# Patient Record
Sex: MALE | Race: WHITE | Employment: FULL TIME | URBAN - METROPOLITAN AREA
[De-identification: names, ages, dates, MRNs, and addresses within clinical notes are randomized per-mention and may not be internally consistent; named-entity substitution may affect disease eponyms.]

---

## 2019-07-03 ENCOUNTER — OFFICE VISIT (OUTPATIENT)
Dept: ORTHOPEDIC SURGERY | Age: 54
End: 2019-07-03

## 2019-07-03 VITALS
HEIGHT: 67 IN | SYSTOLIC BLOOD PRESSURE: 105 MMHG | DIASTOLIC BLOOD PRESSURE: 69 MMHG | HEART RATE: 121 BPM | OXYGEN SATURATION: 93 % | RESPIRATION RATE: 16 BRPM | TEMPERATURE: 97.4 F | BODY MASS INDEX: 38.77 KG/M2 | WEIGHT: 247 LBS

## 2019-07-03 DIAGNOSIS — E66.01 SEVERE OBESITY (HCC): ICD-10-CM

## 2019-07-03 DIAGNOSIS — M50.20 HERNIATED CERVICAL DISC: Primary | ICD-10-CM

## 2019-07-03 RX ORDER — DICLOFENAC SODIUM 75 MG/1
TABLET, DELAYED RELEASE ORAL
COMMUNITY

## 2019-07-03 RX ORDER — TRAVOPROST OPHTHALMIC SOLUTION 0.04 MG/ML
1 SOLUTION OPHTHALMIC EVERY EVENING
COMMUNITY

## 2019-07-03 RX ORDER — METFORMIN HYDROCHLORIDE 1000 MG/1
1000 TABLET ORAL 2 TIMES DAILY WITH MEALS
COMMUNITY

## 2019-07-03 RX ORDER — ATORVASTATIN CALCIUM 20 MG/1
TABLET, FILM COATED ORAL DAILY
COMMUNITY

## 2019-07-03 RX ORDER — CYCLOBENZAPRINE HCL 10 MG
TABLET ORAL
COMMUNITY

## 2019-07-03 RX ORDER — HYDROCODONE BITARTRATE AND ACETAMINOPHEN 7.5; 325 MG/1; MG/1
1 TABLET ORAL
Qty: 28 TAB | Refills: 0 | Status: SHIPPED | OUTPATIENT
Start: 2019-07-03 | End: 2019-07-10

## 2019-07-03 RX ORDER — INSULIN GLARGINE 100 [IU]/ML
INJECTION, SOLUTION SUBCUTANEOUS
COMMUNITY

## 2019-07-03 RX ORDER — CITALOPRAM 10 MG/1
TABLET ORAL DAILY
COMMUNITY

## 2019-07-03 RX ORDER — OMEPRAZOLE 20 MG/1
20 CAPSULE, DELAYED RELEASE ORAL DAILY
COMMUNITY

## 2019-07-03 RX ORDER — LISINOPRIL AND HYDROCHLOROTHIAZIDE 12.5; 2 MG/1; MG/1
TABLET ORAL DAILY
COMMUNITY

## 2019-07-03 RX ORDER — METHYLPREDNISOLONE 4 MG/1
TABLET ORAL
Qty: 1 DOSE PACK | Refills: 0 | Status: SHIPPED | OUTPATIENT
Start: 2019-07-03

## 2019-07-03 NOTE — PROGRESS NOTES
Antionette Herrera  1965   Chief Complaint   Patient presents with    Shoulder Pain     right shoulder pain        HISTORY OF PRESENT ILLNESS  Antionette Herrera is a 47 y.o. male who presents today for evaluation of acute neck and right shoulder pain. Pain started 2 weeks ago. No injury. Acute onset. Pain radiates from neck down to fingers. Feels better when arm is raised over head. Has decreased sensation in 1-3 digits on RUE. Patient describes the pain as burning, numbing, sharp and stabbing that is Constant in nature. Symptoms are worse with any motion of head or arm and is better with  Raising arm over head. Associated symptoms include numbness, tingling, weakness. Since problem started, it: has worsened. Pain does wake patient up at night. Has taken diclofenac, flexeril and home exercise plan of exercises for the problem. Pain is a 6/10. Has tried following treatments: Injections:NO; Brace:NO; Therapy:YES (home exercise plan); Cane/Crutch:NO        . Not on File     No past medical history on file.    Social History     Socioeconomic History    Marital status: UNKNOWN     Spouse name: Not on file    Number of children: Not on file    Years of education: Not on file    Highest education level: Not on file   Occupational History    Not on file   Social Needs    Financial resource strain: Not on file    Food insecurity:     Worry: Not on file     Inability: Not on file    Transportation needs:     Medical: Not on file     Non-medical: Not on file   Tobacco Use    Smoking status: Former Smoker     Last attempt to quit: 7/3/1999     Years since quittin.0    Smokeless tobacco: Never Used   Substance and Sexual Activity    Alcohol use: Not on file    Drug use: Not on file    Sexual activity: Not on file   Lifestyle    Physical activity:     Days per week: Not on file     Minutes per session: Not on file    Stress: Not on file   Relationships    Social connections:     Talks on phone: Not on file     Gets together: Not on file     Attends Taoism service: Not on file     Active member of club or organization: Not on file     Attends meetings of clubs or organizations: Not on file     Relationship status: Not on file    Intimate partner violence:     Fear of current or ex partner: Not on file     Emotionally abused: Not on file     Physically abused: Not on file     Forced sexual activity: Not on file   Other Topics Concern    Not on file   Social History Narrative    Not on file      No past surgical history on file. No family history on file. Current Outpatient Medications   Medication Sig    diclofenac EC (VOLTAREN) 75 mg EC tablet Take  by mouth.  cyclobenzaprine (FLEXERIL) 10 mg tablet Take  by mouth three (3) times daily as needed for Muscle Spasm(s).  citalopram (CELEXA) 10 mg tablet Take  by mouth daily.  atorvastatin (LIPITOR) 20 mg tablet Take  by mouth daily.  lisinopril-hydroCHLOROthiazide (PRINZIDE, ZESTORETIC) 20-12.5 mg per tablet Take  by mouth daily.  omeprazole (PRILOSEC) 20 mg capsule Take 20 mg by mouth daily.  metFORMIN (GLUCOPHAGE) 1,000 mg tablet Take 1,000 mg by mouth two (2) times daily (with meals).  travoprost (TRAVATAN Z) 0.004 % ophthalmic solution Administer 1 Drop to both eyes every evening.  insulin glargine (LANTUS,BASAGLAR) 100 unit/mL (3 mL) inpn by SubCUTAneous route.  semaglutide (OZEMPIC SC) by SubCUTAneous route.  methylPREDNISolone (MEDROL DOSEPACK) 4 mg tablet Per dose pack instructions    HYDROcodone-acetaminophen (NORCO) 7.5-325 mg per tablet Take 1 Tab by mouth every six (6) hours as needed for Pain for up to 7 days. Max Daily Amount: 4 Tabs. No current facility-administered medications for this visit. REVIEW OF SYSTEM   Patient denies: Weight loss, Fever/Chills, HA, Visual changes, Fatigue, Chest pain, SOB, Abdominal pain, N/V/D/C, Blood in stool or urine, Edema.    Pertinent positive as above in HPI. All others were negative    PHYSICAL EXAM:   Visit Vitals  /69 (BP 1 Location: Left arm, BP Patient Position: Sitting)   Pulse (!) 121   Temp 97.4 °F (36.3 °C) (Oral)   Resp 16   Ht 5' 7\" (1.702 m)   Wt 247 lb (112 kg)   SpO2 93%   BMI 38.69 kg/m²     The patient is a well-developed, well-nourished male   in no acute distress. The patient is alert and oriented times three. The patient is alert and oriented times three. Mood and affect are normal.  LYMPHATIC: lymph nodes are not enlarged and are within normal limits  SKIN: normal in color and non tender to palpation. There are no bruises or abrasions noted. NEUROLOGICAL: Motor sensory exam is within normal limits. Reflexes are equal bilaterally. There is normal sensation to pinprick and light touch  MUSCULOSKELETAL:  Examination Neck   Skin Intact   Tenderness, Paracervical +   Paracervical spasms  +   Flexion Decreased 25%   Extension Decreased 25%   Lateral bend left Normal   Lateral bend right Normal   Masses -   Spurling sign -   Biceps reflex Normal   Triceps reflex Normal   Brachioradialis reflex Normal   Sensation Decreased 1-4 digits     Decreased  strength RUE     Examination Right shoulder   Skin Intact   AC joint tenderness -   Biceps tenderness -   Forward flexion/Elevation    Active abduction    Glenohumeral abduction 90   External rotation ROM 30   Internal rotation ROM 30   Apprehension -   Kinjals Relocation -   Jerk -   Load and Shift -   Obriens -   Speeds -   Impingement sign -   Supraspinatus/Empty Can +   External Rotation Strength -, 5/5   Lift Off/Belly Press -, 5/5   Neurovascular Intact           IMAGIN views of cspine read and reviewed by myself reveal loss of cervical lordosis. degnerative spurring noted c4-c7    2view xray right shoulder read and reviewed by myself reveal no acute abnormalities  IMPRESSION:      ICD-10-CM ICD-9-CM    1.  Herniated cervical disc M50.20 722.0 HYDROcodone-acetaminophen (NORCO) 7.5-325 mg per tablet   2. Severe obesity ([address] E66.01 278.01         PLAN:   1. Patient with symptoms indicative of herniated disc. Medrol dose pack and norco given today. STAT MRI cspine and will have follow up with SPINE center ASAP  Risk factors include: BMI>35  2. No cortisone injection indicated today   3. No Physical/Occupational Therapy indicated today  4. Yes diagnostic test indicated today: STAT MRI cspine  5. No durable medical equipment indicated today  6. Yes referral indicated today spine center  7. Yes medications indicated today: Medrol dose pack and norco 7.5  8. Yes Narcotic indicated today for short term acute pain secondary to acute pain. Patient given pain medication for short term acute pain relief. Goal is to treat patient according to above plan and to ultimately have patient off all pain medications once appropriate. If chronic pain management is required beyond what is expected for current orthopedic problem, will refer patient to pain management.   was reviewed and will be reviewed with every medication refill request.         RTC PRN       KILEY [facility]

## 2019-07-03 NOTE — PATIENT INSTRUCTIONS
Stop taking diclofenac now. Start taking medrol dose pack instead. Resume diclofenac after medrol dose pack is completed.

## 2019-07-03 NOTE — PROGRESS NOTES
1. Have you been to the ER, urgent care clinic since your last visit? Hospitalized since your last visit? NO    2. Have you seen or consulted any other health care providers outside of the [address] [place] [address] since your last visit? Include any pap smears or colon screening.  Yes, Dr. Deanna Salcedo

## 2019-07-05 ENCOUNTER — TELEPHONE (OUTPATIENT)
Dept: ORTHOPEDIC SURGERY | Age: 54
End: 2019-07-05

## 2019-07-05 DIAGNOSIS — M50.20 HERNIATED CERVICAL DISC: Primary | ICD-10-CM

## 2019-07-05 NOTE — TELEPHONE ENCOUNTER
Called and spoke to patient about his insurance not covering the MRI and also informed him the Kailyn Alert will be in this afternoon and can advise him of next step.

## 2019-07-05 NOTE — TELEPHONE ENCOUNTER
Received a phone call from Jacque Cutler with [employer]. Jacque Cutler states they did not approve the STAT MRI of Cervical Spine per following information:   Unable to show proof of no improved over 6 weeks, no notations of medication treatments or other therapies such as home, physical or manual therapy. No notation of upcoming surgery or procedures. This urgent case has been closed as of 07/05/19 3:13pm. You can contact to discuss further details with their physician at [phone].

## 2019-07-05 NOTE — TELEPHONE ENCOUNTER
I have obtained the approval [place] pre-authorization [id number] effective 07/05/19-08/03/19) for the MRI of the Cervical Spine, however, I cannot get the patient scheduled until I can speak with him. I have left a generic message on a generic voice mail requesting him to return my call. Thank you.

## 2019-07-05 NOTE — TELEPHONE ENCOUNTER
I have spoken to Mr. Jhon Araujo. He will attend  on Sunday, 07/07/19, at 8:30AM for his MRI. He is scheduled with Dr. Judah Garcia for his MRI review on Friday, 07/12/19. He is aware. Thank you.

## 2019-07-05 NOTE — TELEPHONE ENCOUNTER
All of this is documented in my note. Insurance needs to be called.  Get a peer to peer or resubmit order today STAT

## 2019-07-07 ENCOUNTER — HOSPITAL ENCOUNTER (OUTPATIENT)
Age: 54
Discharge: HOME | End: 2019-07-07
Attending: PHYSICIAN ASSISTANT
Payer: COMMERCIAL

## 2019-07-07 DIAGNOSIS — M50.20 HERNIATED CERVICAL DISC: ICD-10-CM

## 2019-07-07 PROCEDURE — 72141 MRI NECK SPINE W/O DYE: CPT

## 2019-07-12 ENCOUNTER — OFFICE VISIT (OUTPATIENT)
Dept: ORTHOPEDIC SURGERY | Age: 54
End: 2019-07-12

## 2019-07-12 VITALS
SYSTOLIC BLOOD PRESSURE: 141 MMHG | HEIGHT: 67 IN | DIASTOLIC BLOOD PRESSURE: 88 MMHG | HEART RATE: 87 BPM | TEMPERATURE: 97.6 F | BODY MASS INDEX: 38.61 KG/M2 | WEIGHT: 246 LBS | OXYGEN SATURATION: 96 %

## 2019-07-12 DIAGNOSIS — M50.30 DDD (DEGENERATIVE DISC DISEASE), CERVICAL: ICD-10-CM

## 2019-07-12 DIAGNOSIS — S16.1XXA STRAIN OF NECK MUSCLE, INITIAL ENCOUNTER: ICD-10-CM

## 2019-07-12 DIAGNOSIS — M50.20 HNP (HERNIATED NUCLEUS PULPOSUS), CERVICAL: Primary | ICD-10-CM

## 2019-07-12 RX ORDER — GABAPENTIN 300 MG/1
300 CAPSULE ORAL 3 TIMES DAILY
Qty: 300 CAP | Refills: 1 | Status: SHIPPED | OUTPATIENT
Start: 2019-07-12

## 2019-07-12 NOTE — LETTER
7/12/19 Patient: Vanessa Beltran YOB: 1965 Date of Visit: 7/12/2019 ASHLEY Toledo 
[address] [address] [address] [address] VIA Facsimile: [phone] Adrián Herrera [facility] [address] [facility] 20 [address] [address] [address] VIA In Basket Dear ASHLEY Toledo PA, Thank you for referring Mr. Solange Kerr to [address] [place] [address] for evaluation. My notes for this consultation are attached. If you have questions, please do not hesitate to call me. I look forward to following your patient along with you. Sincerely, Alvaro Schultz MD

## 2019-07-12 NOTE — PROGRESS NOTES
[facility]  [address], [address]  Phone: [phone]  Fax: [phone]        INITIAL CONSULTATION      HISTORY OF PRESENT ILLNESS:  Genesis Nicole is a 47 y.o. male whom is referred from [place], [place] secondary to progressive neck pain extending into the RUE to the 1-3 digits. He rates his pain 6/10. Pain relieved with raising his arm above his head. Treated with steroids with no relief. Pt denies h/o spinal surgery, injections, and PT/chiropractor. The patient has a history of DM and reports blood sugars are well controlled, consistently remaining below 200. Pt denies dropping things or loss of balance. Pt denies change in bowel or bladder habits. Pt denies fever, weight loss, or skin changes. Not a candidate for Cymbalta or Topamax. PmHx DM, depression, glaucoma. Note from [place], [place] dated 19 indicating patient was seen with c/o acute neck and right shoulder. Pain radiates from neck down to fingers. Feels better when arm raised over head. Has decrease sensation in digits 1-3 RUE. Pain has gotten progressively worse. Has taken Diclofenac, Flexeril, and home exercise plan. Treated with MDP and Norco at that time. Set him up for an MRI of cervical spine. Referred to us. Cervical spine MRI dated  reviewed. Per report, broad-based disc-osteophyte protrusions at C5-6 and C6-7 levels producing foraminal narrowing. Most pronounced at C6-7 level on the right side. Additional disc-osteophyte protrusion at C7-T1 resulting in right neural  foraminal narrowing. The patient is RHD.  reviewed. Body mass index is 38.53 kg/m². PCP: ASHLEY Lawton    No past medical history on file. No past surgical history on file.       Social History     Tobacco Use    Smoking status: Former Smoker     Last attempt to quit: 7/3/1999     Years since quittin.0    Smokeless tobacco: Never Used   Substance Use Topics    Alcohol use: Not on file     Work status: The patient is unknown. Marital status: The patient is unknown. Current Outpatient Medications   Medication Sig Dispense Refill    diclofenac EC (VOLTAREN) 75 mg EC tablet Take  by mouth.  cyclobenzaprine (FLEXERIL) 10 mg tablet Take  by mouth three (3) times daily as needed for Muscle Spasm(s).  citalopram (CELEXA) 10 mg tablet Take  by mouth daily.  atorvastatin (LIPITOR) 20 mg tablet Take  by mouth daily.  lisinopril-hydroCHLOROthiazide (PRINZIDE, ZESTORETIC) 20-12.5 mg per tablet Take  by mouth daily.  omeprazole (PRILOSEC) 20 mg capsule Take 20 mg by mouth daily.  metFORMIN (GLUCOPHAGE) 1,000 mg tablet Take 1,000 mg by mouth two (2) times daily (with meals).  travoprost (TRAVATAN Z) 0.004 % ophthalmic solution Administer 1 Drop to both eyes every evening.  insulin glargine (LANTUS,BASAGLAR) 100 unit/mL (3 mL) inpn by SubCUTAneous route.  semaglutide (OZEMPIC SC) by SubCUTAneous route.  methylPREDNISolone (MEDROL DOSEPACK) 4 mg tablet Per dose pack instructions 1 Dose Pack 0       Not on File       No family history on file. REVIEW OF SYSTEMS  Constitutional symptoms: Negative  Eyes: Negative  Ears, Nose, Throat, and Mouth: Negative  Cardiovascular: Negative  Respiratory: Negative  Genitourinary: Negative  Integumentary (Skin and/or breast): Negative  Musculoskeletal: Positive for neck pain, RUE pain  Extremities: Negative for edema.   Endocrine/Rheumatologic: Negative  Hematologic/Lymphatic: Negative  Allergic/Immunologic: Negative  Psychiatric: Negative       PHYSICAL EXAMINATION  Visit Vitals  /88 (BP 1 Location: Left arm, BP Patient Position: Sitting)   Pulse 87   Temp 97.6 °F (36.4 °C) (Oral)   Ht 5' 7\" (1.702 m)   Wt 246 lb (111.6 kg)   SpO2 96%   BMI 38.53 kg/m²       CONSTITUTIONAL: NAD, A&O x 3  HEART: Regular rate and rhythm  ABDOMEN: Positive bowel sounds, soft, nontender, and nondistended  LUNGS: Clear to auscultation bilaterally. SKIN: Negative for rash. RANGE OF MOTION: The patient has full passive range of motion in all four extremities. SENSATION: Sensation is intact to light touch throughout. MOTOR:   Straight Leg Raise: Negative, bilateral  Waggoner: Negative, bilateral  Tandem Gait: Neg. Deep tendon reflexes are 0 at the brachioradialis, biceps, and triceps. Deep tendon reflexes are 0 at the knees and ankles bilaterally. Shoulder AB/Flex Elbow Flex Wrist Ext Elbow Ext Wrist Flex Hand Intrin Tone   Right +4/5 +4/5 +4/5 +4/5 +4/5 +4/5 +4/5   Left +4/5 +4/5 +4/5 +4/5 +4/5 +4/5 +4/5              Hip Flex Knee Ext Knee Flex Ankle DF GTE Ankle PF Tone   Right +4/5 +4/5 +4/5 +4/5 +4/5 +4/5 +4/5   Left +4/5 +4/5 +4/5 +4/5 +4/5 +4/5 +4/5         ASSESSMENT   Diagnoses and all orders for this visit:    1. HNP (herniated nucleus pulposus), cervical    2. Strain of neck muscle, initial encounter    3. DDD (degenerative disc disease), cervical           IMPRESSIONS/RECOMMENDATIONS:  Patients presents today with c/o progressive neck pain extending into the RUE to the 1-3 digits. I will try him on Neurontin 300 mg TID. The risks, benefits, and potential side effects of this medication were discussed. Patient understands and wishes to proceed. Patient advised to call the office if intolerant to new medication. I will refer him to physical therapy with an emphasis on HEP. Multiple treatment options were discussed. Patient is neurologically intact. I will see the patient back in 1 month's time or earlier if needed. Written by Dillon Bocanegra, as dictated by Phyllis Machado MD  I examined the patient, reviewed and agree with the note.

## 2019-07-22 ENCOUNTER — HOSPITAL ENCOUNTER (OUTPATIENT)
Dept: PHYSICAL THERAPY | Age: 54
Discharge: HOME | End: 2019-07-22
Payer: COMMERCIAL

## 2019-07-22 PROCEDURE — 97140 MANUAL THERAPY 1/> REGIONS: CPT

## 2019-07-22 PROCEDURE — 97161 PT EVAL LOW COMPLEX 20 MIN: CPT

## 2019-07-22 PROCEDURE — 97530 THERAPEUTIC ACTIVITIES: CPT

## 2019-07-22 NOTE — PROGRESS NOTES
PT DAILY TREATMENT NOTE 8    Patient Name: Page Robins  Date:2019  : 1965  [x]  Patient  Verified  Payor: Mikayla Ends / Plan: [address] [place] [address] / Product Type: PPO /    In time:405  Out time:450  Total Treatment Time (min): 45  Visit #: 1 of 4    Treatment Area: Cervicalgia [M54.2]  Other cervical disc displacement, unspecified cervical region [M50.20]    SUBJECTIVE  Pain Level (0-10 scale): 2  Any medication changes, allergies to medications, adverse drug reactions, diagnosis change, or new procedure performed?: [x] No    [] Yes (see summary sheet for update)  Subjective functional status/changes:   [x] See Eval form in paper chart     OBJECTIVE      20 min [x]Eval                  []Re-Eval         8 min Manual Therapy:  Gentle STM and manual cervical traction   Rationale: decrease pain, increase ROM, increase tissue extensibility, decrease trigger points and increase postural awareness to centralize right UE symptoms. 17 min Therapeutic Activity:  [x]  See flow sheet :HEP review, postural training, review of ergonomic setup at work   Rationale: increase ROM, increase strength, improve coordination, improve balance and increase proprioception  to improve the patients ability to centralize right UE symptoms. With   [] TE   [] TA   [] neuro   [] other: Patient Education: [x] Review HEP    [] Progressed/Changed HEP based on:   [] positioning   [] body mechanics   [] transfers   [] heat/ice application    [] other:           Pain Level (0-10 scale) post treatment: 2    ASSESSMENT:   [x]  See Evaluation          Goals:  Short Term Goals: To be accomplished in 1 weeks:  1. Therapist to establish HEP for strength and ROM to improve ease with ADLs. Long Term Goals: To be accomplished in 4 treatments:  1. Patient will be independent with HEP to improve carryover of functional gains with ADLs between visits. Eval Status:n/a  2.  Pt will increase cervical rotation to the right by 15 degrees to increase ease with driving/ADLs. Eval Status:    Cervical rotation right: 30 deg    Cervical rotation left: 45 deg  3. Pt will increase FOTO score to 70 points to demostrate improved function. Eval Status: FOTO: 54  4. Pt will report decrease in radicular symptoms into right UE by 50% to demonstrate centralization of symptoms and to improve ADL performance.     Eval Status:n/a    PLAN      [x]  Continue plan of care           Pete Stallings PT 7/22/2019  5:03 PM

## 2019-07-22 NOTE — PROGRESS NOTES
[facility] 45  [address] [facility] [address], [address] [phone] fax    Plan of Care/ Statement of Necessity for Physical Therapy Services           Patient name: Bhupinder Lozoya Start of Care: 2019   Referral source: Josette Eid MD : 1965    Medical Diagnosis: Cervicalgia [M54.2]  Other cervical disc displacement, unspecified cervical region [M50.20]  Payor: BLUE CROSS / Plan: [address] [place] [address] / Product Type: PPO /  Onset Date:1 month    Treatment Diagnosis: neck pain   Prior Hospitalization: see medical history Provider#: [id number]   Medications: Verified on Patient summary List    Comorbidities: DM, HTN   Prior Level of Function: works as , functionally independent    [address] and following information is based on the information from the initial evaluation. Assessment/ key information: Patient is a 47 y.o.male presenting with Cervicalgia [M54.2]  Other cervical disc displacement, unspecified cervical region [M50.20]. Mr. Osvaldo Fang presents to initial PT evaluation with c/o worsening right UE referred symptoms over the past month. MRI revealed multilevel HNP and degenerative spinal changes. He saw mild improvement in right UE symptoms with manual cervical traction, but reports constant numbness into the 2nd & 3rd digits regardless of neck positioning. UE strength WNL with full B shoulder ROM as well. No upper motor neuron signs present. Displays no balance/coordination changes. . Patient will benefit from skilled PT services to address deficits and facilitate return to premorbid activity level and promote improved quality of life.        Evaluation Complexity History MEDIUM  Complexity : 1-2 comorbidities / personal factors will impact the outcome/ POC ; Examination LOW Complexity : 1-2 Standardized tests and measures addressing body structure, function, activity limitation and / or participation in recreation  ;Presentation MEDIUM Complexity : Evolving with changing characteristics  ; Clinical Decision Making MEDIUM Complexity : FOTO score of 26-74  Overall Complexity Rating: LOW   Problem List: pain affecting function, decrease ROM, decrease strength, edema affecting function, impaired gait/ balance, decrease ADL/ functional abilitiies, decrease activity tolerance, decrease flexibility/ joint mobility and decrease transfer abilities   Treatment Plan may include any combination of the following: Therapeutic exercise, Therapeutic activities, Neuromuscular re-education, Physical agent/modality, Manual therapy, Patient education, Self Care training, Functional mobility training and Home safety training  Patient / Family readiness to learn indicated by: asking questions, trying to perform skills and interest  Persons(s) to be included in education: patient (P)  Barriers to Learning/Limitations: None  Patient Goal (s): get relief of pain.   Patient Self Reported Health Status: good  Rehabilitation Potential:fair  Short Term Goals: To be accomplished in 1 weeks:  1. Therapist to establish HEP for strength and ROM to improve ease with ADLs. Long Term Goals: To be accomplished in 4 treatments:  1. Patient will be independent with HEP to improve carryover of functional gains with ADLs between visits. Eval Status:n/a  2. Pt will increase cervical rotation to the right by 15 degrees to increase ease with driving/ADLs. Eval Status:    Cervical rotation right: 30 deg    Cervical rotation left: 45 deg  3. Pt will increase FOTO score to 70 points to demostrate improved function. Eval Status: FOTO: 54  4. Pt will report decrease in radicular symptoms into right UE by 50% to demonstrate centralization of symptoms and to improve ADL performance. Eval Status:n/a    Frequency / Duration: Patient to be seen 2 times per week for 4 treatments.     Patient/ Caregiver education and instruction: Diagnosis, prognosis, self care, activity modification and exercises   [x]  Plan of care has been reviewed with PALOMA Sotelo, PT 7/22/2019 4:56 PM    ________________________________________________________________________    I certify that the above Therapy Services are being furnished while the patient is under my care. I agree with the treatment plan and certify that this therapy is necessary.     Physician's Signature:____________Date:_________TIME:________    ** Signature, Date and Time must be completed for valid certification **    Please sign and return to [facility] [address]  [address] [facility] [address], [address] [phone] fax

## 2019-07-24 ENCOUNTER — HOSPITAL ENCOUNTER (OUTPATIENT)
Dept: PHYSICAL THERAPY | Age: 54
Discharge: HOME | End: 2019-07-24
Payer: COMMERCIAL

## 2019-07-24 PROCEDURE — 97140 MANUAL THERAPY 1/> REGIONS: CPT

## 2019-07-24 PROCEDURE — 97012 MECHANICAL TRACTION THERAPY: CPT

## 2019-07-24 PROCEDURE — 97110 THERAPEUTIC EXERCISES: CPT

## 2019-07-24 NOTE — PROGRESS NOTES
PT DAILY TREATMENT NOTE 10-18    Patient Name: Derrell Buchanan  Date:2019  : 1965  [x]  Patient  Verified  Payor: [employer] [place] / Plan: [address] [place] [address] / Product Type: PPO /    In time:1152  Out time:1231  Total Treatment Time (min): 39  Visit #: 2 of 4    Medicare/BCBS Only   Total Timed Codes (min):  29 1:1 Treatment Time:  29       Treatment Area: Cervicalgia [M54.2]  Other cervical disc displacement, unspecified cervical region [M50.20]    SUBJECTIVE  Pain Level (0-10 scale): 2  Any medication changes, allergies to medications, adverse drug reactions, diagnosis change, or new procedure performed?: [x] No    [] Yes (see summary sheet for update)  Subjective functional status/changes:   [] No changes reported  \"I have some pain into my right shoulder. \"    OBJECTIVE    Modality rationale: decrease pain and increase tissue extensibility to improve the patients ability to improve mobility and positional tolerance   Min Type Additional Details    [] Estim:  []Unatt       []IFC  []Premod                        []Other:  []w/ice   []w/heat  Position:  Location:    [] Estim: []Att    []TENS instruct  []NMES                    []Other:  []w/US   []w/ice   []w/heat  Position:  Location:   10 [x]  Traction: [x] Cervical       []Lumbar                       [] Prone          [x]Supine                       [x]Intermittent   []Continuous Lbs: 15#  [] before manual  [x] after manual    []  Ultrasound: []Continuous   [] Pulsed                           []1MHz   []3MHz W/cm2:  Location:    []  Iontophoresis with dexamethasone         Location: [] Take home patch   [] In clinic    []  Ice     []  heat  []  Ice massage  []  Laser   []  Anodyne Position:  Location:    []  Laser with stim  []  Other:  Position:  Location:    []  Vasopneumatic Device Pressure:       [] lo [] med [] hi   Temperature: [] lo [] med [] hi   [x] Skin assessment post-treatment:  [x]intact []redness- no adverse reaction []redness  adverse reaction:     21 min Therapeutic Exercise:  [x] See flow sheet :   Rationale: increase ROM and increase strength to improve the patients ability to perform ADLs     8 min Manual Therapy:  SOR, STM to c/s paraspinals and B UT   Rationale: decrease pain, increase ROM, increase tissue extensibility, decrease trigger points and increase postural awareness to improve mobility and upright posture        With   [x] TE   [] TA   [] neuro   [] other: Patient Education: [x] Review HEP    [] Progressed/Changed HEP based on:   [x] positioning   [x] body mechanics   [] transfers   [] heat/ice application    [] other:      Other Objective/Functional Measures:      Pain Level (0-10 scale) post treatment: 0    ASSESSMENT/Changes in Function: Initiated POC. Pt reports radicular sx into his right shoulder prior to manual therapy. Some cuing needed to improve seated posture during exercises. Pt reports no pain or radicular sx following mechanical traction. Patient will continue to benefit from skilled PT services to modify and progress therapeutic interventions, address functional mobility deficits, address ROM deficits, address strength deficits, analyze and address soft tissue restrictions, analyze and cue movement patterns, analyze and modify body mechanics/ergonomics, assess and modify postural abnormalities, address imbalance/dizziness and instruct in home and community integration to attain remaining goals. [x]  See Plan of Care  []  See progress note/recertification  []  See Discharge Summary         Progress towards goals / Updated goals:  Short Term Goals: To be accomplished in 1 weeks:  1. Therapist to establish HEP for strength and ROM to improve ease with ADLs. MET      Long Term Goals: To be accomplished in 4 treatments:  1. Patient will be independent with HEP to improve carryover of functional gains with ADLs between visits. Eval Status:n/a  2.  Pt will increase cervical rotation to the right by 15 degrees to increase ease with driving/ADLs. Eval Status:              Cervical rotation right: 30 deg              Cervical rotation left: 45 deg  3. Pt will increase FOTO score to 70 points to demostrate improved function. Eval Status: FOTO: 54  4. Pt will report decrease in radicular symptoms into right UE by 50% to demonstrate centralization of symptoms and to improve ADL performance.                Eval Status:n/a    PLAN  []  Upgrade activities as tolerated     [x]  Continue plan of care  []  Update interventions per flow sheet       []  Discharge due to:_  []  Other:_      Umer Mcmahan PTA, CSCS 7/24/2019  12:34 PM    Future Appointments   Date Time Provider Ayesha Elle   7/24/2019 12:00 PM Hans Garcia PTA [facility]   7/30/2019  3:00 PM Hans Garcia PTA [facility]   8/7/2019  8:30 AM Sallie Velásquez MD [facility]

## 2019-07-30 ENCOUNTER — HOSPITAL ENCOUNTER (OUTPATIENT)
Dept: PHYSICAL THERAPY | Age: 54
Discharge: HOME | End: 2019-07-30
Payer: COMMERCIAL

## 2019-07-30 PROCEDURE — 97012 MECHANICAL TRACTION THERAPY: CPT

## 2019-07-30 PROCEDURE — 97140 MANUAL THERAPY 1/> REGIONS: CPT

## 2019-07-30 PROCEDURE — 97110 THERAPEUTIC EXERCISES: CPT

## 2019-07-30 NOTE — PROGRESS NOTES
PT DAILY TREATMENT NOTE 10-18    Patient Name: Yi Hobbs  Date:2019  : 1965  [x]  Patient  Verified  Payor: [employer] / Plan: [address] [place] [address] / Product Type: PPO /    In time:255  Out time:335  Total Treatment Time (min): 40  Visit #: 3 of 4    Medicare/BCBS Only   Total Timed Codes (min):  30 1:1 Treatment Time:  30       Treatment Area: Cervicalgia [M54.2]  Other cervical disc displacement, unspecified cervical region [M50.20]    SUBJECTIVE  Pain Level (0-10 scale): 2  Any medication changes, allergies to medications, adverse drug reactions, diagnosis change, or new procedure performed?: [x] No    [] Yes (see summary sheet for update)  Subjective functional status/changes:   [] No changes reported  \"I have more a nag than a pain. \"    OBJECTIVE    Modality rationale: decrease pain and increase tissue extensibility to improve the patients ability to improve mobility and positional tolerance   Min Type Additional Details    [] Estim:  []Unatt       []IFC  []Premod                        []Other:  []w/ice   []w/heat  Position:  Location:    [] Estim: []Att    []TENS instruct  []NMES                    []Other:  []w/US   []w/ice   []w/heat  Position:  Location:   10 [x]  Traction: [x] Cervical       []Lumbar                       [] Prone          [x]Supine                       [x]Intermittent   []Continuous Lbs: 15#  [] before manual  [x] after manual    []  Ultrasound: []Continuous   [] Pulsed                           []1MHz   []3MHz W/cm2:  Location:    []  Iontophoresis with dexamethasone         Location: [] Take home patch   [] In clinic    []  Ice     []  heat  []  Ice massage  []  Laser   []  Anodyne Position:  Location:    []  Laser with stim  []  Other:  Position:  Location:    []  Vasopneumatic Device Pressure:       [] lo [] med [] hi   Temperature: [] lo [] med [] hi   [x] Skin assessment post-treatment:  [x]intact []redness- no adverse reaction    []redness  adverse reaction:     22 min Therapeutic Exercise:  [x] See flow sheet :   Rationale: increase ROM and increase strength to improve the patients ability to perform ADLs    8 min Manual Therapy:  SOR, STM to c/s paraspinals and B UT   Rationale: decrease pain, increase ROM, increase tissue extensibility, decrease trigger points and increase postural awareness to improve mobility and positional tolerance          With   [x] TE   [] TA   [] neuro   [] other: Patient Education: [x] Review HEP    [] Progressed/Changed HEP based on:   [x] positioning   [x] body mechanics   [] transfers   [] heat/ice application    [] other:      Other Objective/Functional Measures:      Pain Level (0-10 scale) post treatment: 0-1    ASSESSMENT/Changes in Function: Pt is making great progress with traction. He states that he still gets radicular sx from his right elbow to 2nd and 3rd digit. Pt to follow up with MD next week and be reassessed at his NV. Patient will continue to benefit from skilled PT services to modify and progress therapeutic interventions, address functional mobility deficits, address ROM deficits, address strength deficits, analyze and address soft tissue restrictions, analyze and cue movement patterns, analyze and modify body mechanics/ergonomics, assess and modify postural abnormalities, address imbalance/dizziness and instruct in home and community integration to attain remaining goals. [x]  See Plan of Care  []  See progress note/recertification  []  See Discharge Summary         Progress towards goals / Updated goals:  Short Term Goals: To be accomplished in 1 weeks:  1. Therapist to establish HEP for strength and ROM to improve ease with ADLs. MET      Long Term Goals: To be accomplished in 4 treatments:  1. Patient will be independent with HEP to improve carryover of functional gains with ADLs between visits.             Eval Status:n/a    Reports compliance  2.  Pt will increase cervical rotation to the right by 15 degrees to increase ease with driving/ADLs.             Eval Status:              Cervical rotation right: 30 deg              Cervical rotation left: 45 deg    Measure at NV  3. Pt will increase FOTO score to 70 points to demostrate improved function.              Eval Status: FOTO: 54    Assess NV  4.  Pt will report decrease in radicular symptoms into right UE by 50% to demonstrate centralization of symptoms and to improve ADL performance.               Eval Status:n/a    Good response to mechanical traction    PLAN  []  Upgrade activities as tolerated     [x]  Continue plan of care  []  Update interventions per flow sheet       []  Discharge due to:_  []  Other:_      Edmund Kirk PTA, CSCS 7/30/2019  3:39 PM    Future Appointments   Date Time Provider Ayesha Almeida   8/7/2019  8:30 AM Sheri Galindo NP [place]

## 2019-08-06 ENCOUNTER — HOSPITAL ENCOUNTER (OUTPATIENT)
Dept: PHYSICAL THERAPY | Age: 54
Discharge: HOME | End: 2019-08-06
Payer: COMMERCIAL

## 2019-08-06 PROCEDURE — 97110 THERAPEUTIC EXERCISES: CPT

## 2019-08-06 PROCEDURE — 97530 THERAPEUTIC ACTIVITIES: CPT

## 2019-08-06 PROCEDURE — 97012 MECHANICAL TRACTION THERAPY: CPT

## 2019-08-06 NOTE — PROGRESS NOTES
[facility] [address] [facility] [address], [address] [phone] fax    Progress Note  Patient name: Epifanio Gabriel Start of Care: 2019   Referral source: Lindia Oppenheim, MD : 1965                Medical Diagnosis: Cervicalgia [M54.2]  Other cervical disc displacement, unspecified cervical region [M50.20]  Payor: [employer] / Plan: [address] [place] [address] / Product Type: PPO /  Onset Date:1 month                Treatment Diagnosis: neck pain   Prior Hospitalization: see medical history Provider#: [id number]   Medications: Verified on Patient summary List    Comorbidities: DM, HTN   Prior Level of Function: works as , functionally independent    Visits from [facility]: 4    Missed Visits: 0    Established Goals:          Short Term Goals: To be accomplished in 1 weeks:  1. Therapist to establish HEP for strength and ROM to improve ease with ADLs.             MET      Long Term Goals: To be accomplished in 4 treatments:  1. Patient will be independent with HEP to improve carryover of functional gains with ADLs between visits.             Eval Status:n/a              MET  2. Pt will increase cervical rotation to the right by 15 degrees to increase ease with driving/ADLs.             Eval Status:              Cervical rotation right: 45 deg               Cervical rotation left: 30 deg             PROGRESSING: left 36 deg; right 53 deg  3. Pt will increase FOTO score to 70 points to demostrate improved function.              Eval Status: FOTO: 54              MET: 72  4.  Pt will report decrease in radicular symptoms into right UE by 50% to demonstrate centralization of symptoms and to improve ADL performance.               Eval Status:n/a   PROGRESSIN% improvement with frequency and severity of radicular symptoms              Key Functional Changes:   Functional Gains: Reduced frequency and severity of radicular symptoms, improved ability to don shoes and socks, improved tolerance and rotation with driving  Functional Deficits: Riding motorcycle, pain with prolonged periods in car  % improvement: 65%  Pain   Average: 2/10       Best: 1/10     Worst: 10/10  Patient Goal: \"To be 100%\"    Updated Goals: to be achieved in 1 week:  1. Pt will increase cervical rotation to the right by 15 degrees to increase ease with driving/ADLs.   PN Status: left 36 deg; right 53 deg  2. Pt will report decrease in radicular symptoms into right UE by 50% to demonstrate centralization of symptoms and to improve ADL performance. PN Status: 40% improvement with frequency and severity of radicular symptoms          ASSESSMENT/RECOMMENDATIONS:  Mr. Kelly Paiz has been a delight to treat and reports 65% improvement. He reports improved ability to turn his head while driving and an overall decrease in radicular symptoms; however, he is still unable to comfortably ride his motor cycle due to his remaining deficits. He continues to respond well to mechanical traction in the clinic and would benefit from a home traction unit to control symptoms after discharge. Pt can control symptoms using inversion table until home traction unit is approved. Pt would benefit from one more visit to educate him about how to safely use the home unit once it is ordered.        [x]Continue therapy per initial plan/protocol at a frequency of  1 x per week for 1 VISIT  []Continue therapy with the following recommended changes:_____________________      _____________________________________________________________________  []Discontinue therapy progressing towards or have reached established goals  []Discontinue therapy due to lack of appreciable progress towards goals  []Discontinue therapy due to lack of attendance or compliance  []Await Physician's recommendations/decisions regarding therapy  []Other:________________________________________________________________    Thank you for this referral.    Lolly Beck 8/6/2019 3:33 PM  NOTE TO PHYSICIAN:  PLEASE COMPLETE THE ORDERS BELOW AND   FAX TO [facility]: ([phone]  If you are unable to process this request in 24 hours please contact our office: [phone]    []  I have read the above report and request that my patient continue as recommended. []  I have read the above report and request that my patient continue therapy with the following changes/special instructions:________________________________________  [] I have read the above report and request that my patient be discharged from therapy.     [de-identified] Signature:____________Date:_________TIME:________    [facility], Date and Time must be completed for valid certification **

## 2019-08-06 NOTE — PROGRESS NOTES
PT DAILY TREATMENT NOTE 10-18    Patient Name: Ramy Holden  Date:2019  : 1965  [x]  Patient  Verified  Payor: [employer] [place] / Plan: [address] [place] [address] / Product Type: PPO /    In time:300  Out time:404  Total Treatment Time (min): 59  Visit #: 4 of 4    Medicare/BCBS Only   Total Timed Codes (min):  54 1:1 Treatment Time:  50       Treatment Area: Cervicalgia [M54.2]  Other cervical disc displacement, unspecified cervical region [M50.20]    SUBJECTIVE  Pain Level (0-10 scale): 0  Any medication changes, allergies to medications, adverse drug reactions, diagnosis change, or new procedure performed?: [x] No    [] Yes (see summary sheet for update)  Subjective functional status/changes:   [] No changes reported  Pt reports mild tingling in his UE.     OBJECTIVE    Modality rationale: decrease pain, increase tissue extensibility and increase muscle contraction/control to improve the patients ability to tolerate sustained positions   Min Type Additional Details    [] Estim:  []Unatt       []IFC  []Premod                        []Other:  []w/ice   []w/heat  Position:  Location:    [] Estim: []Att    []TENS instruct  []NMES                    []Other:  []w/US   []w/ice   []w/heat  Position:  Location:   10 [x]  Traction: [x] Cervical       []Lumbar                       [] Prone          [x]Supine                       []Intermittent   [x]Continuous Lbs: 15#  [] before manual  [] after manual    []  Ultrasound: []Continuous   [] Pulsed                           []1MHz   []3MHz W/cm2:  Location:    []  Iontophoresis with dexamethasone         Location: [] Take home patch   [] In clinic    []  Ice     []  heat  []  Ice massage  []  Laser   []  Anodyne Position:  Location:    []  Laser with stim  []  Other:  Position:  Location:    []  Vasopneumatic Device Pressure:       [] lo [] med [] hi   Temperature: [] lo [] med [] hi   [x] Skin assessment post-treatment:  [x]intact []redness- no adverse reaction    []redness  adverse reaction:     30 min Therapeutic Exercise:  [x] See flow sheet :   Rationale: increase ROM and increase strength to improve the patients ability to perform ADLs. 20 min Therapeutic Activity:  [x]  See flow sheet :   Rationale: Assess pt's functional status and educate pt about home traction unit     With   [x] TE   [x] TA   [] neuro   [] other: Patient Education: [x] Review HEP    [] Progressed/Changed HEP based on:   [x] positioning   [x] body mechanics   [] transfers   [] heat/ice application    [] other:      Other Objective/Functional Measures:      Cervical rotation AROM:  Left = 36 degrees  Right = 53 degrees    Pain Level (0-10 scale) post treatment: 2    ASSESSMENT/Changes in Function: See PN    Patient will continue to benefit from skilled PT services to modify and progress therapeutic interventions, address functional mobility deficits, address ROM deficits, address strength deficits, analyze and address soft tissue restrictions, analyze and cue movement patterns, analyze and modify body mechanics/ergonomics and assess and modify postural abnormalities to attain remaining goals. []  See Plan of Care  [x]  See progress note/recertification  []  See Discharge Summary         Progress towards goals / Updated goals:  1. Pt will increase cervical rotation to the right by 15 degrees to increase ease with driving/ADLs.             PN Status: left 36 deg; right 53 deg  2. Pt will report decrease in radicular symptoms into right UE by 50% to demonstrate centralization of symptoms and to improve ADL performance.                PN Status: 40% improvement with frequency and severity of radicular symptoms      PLAN  []  Upgrade activities as tolerated     [x]  Continue plan of care  []  Update interventions per flow sheet       []  Discharge due to:_  []  Other:_      Iliana Hernandez 8/6/2019  4:00 PM    Future Appointments   Date Time Provider Ayesha Almeida   8/7/2019  8:30 AM Mateo, Miriam Mchugh, MARGOT [place]

## 2019-08-07 ENCOUNTER — OFFICE VISIT (OUTPATIENT)
Dept: ORTHOPEDIC SURGERY | Age: 54
End: 2019-08-07

## 2019-08-07 VITALS
RESPIRATION RATE: 18 BRPM | OXYGEN SATURATION: 97 % | SYSTOLIC BLOOD PRESSURE: 134 MMHG | HEART RATE: 88 BPM | WEIGHT: 250.4 LBS | DIASTOLIC BLOOD PRESSURE: 79 MMHG | HEIGHT: 67 IN | BODY MASS INDEX: 39.3 KG/M2 | TEMPERATURE: 98.5 F

## 2019-08-07 DIAGNOSIS — M50.20 HNP (HERNIATED NUCLEUS PULPOSUS), CERVICAL: Primary | ICD-10-CM

## 2019-08-07 DIAGNOSIS — M50.30 DDD (DEGENERATIVE DISC DISEASE), CERVICAL: ICD-10-CM

## 2019-08-07 NOTE — PROGRESS NOTES
Chief complaint/History of Present Illness:  Chief Complaint   Patient presents with    Neck Pain     4 week follow up     Arm Pain     RUE    Finger Pain     2 digits right hand     HPI  Genesis Nicole is a  47 y.o.  male      HISTORY OF PRESENT ILLNESS:  Mr. Deandra Sheth comes in today for followup of his neck pain with right upper extremity pain, right shoulder pain that travels to his first to third digits of his right hand. This started the end of June. He saw Dr. Marco Garces as a new patient and was started on Neurontin 300 mg three times day and put in physical therapy. MRI did show a disc herniation and degenerative disc disease. He has completed physical therapy now, and he states he is doing better. He still gets some stiffness in the lower part of his neck, in between his shoulder blades, and the symptoms in his second and third digits are still present, but he is better than he was. When he saw Dr. Marco Garces, his pain level was a 6/10. It is now down to a 2/10. He does report when he has to drive long distances, such as driving to [place] last week, he will get that intense right shoulder pain. In fact, when he did drive to [place], he had to go see a chiropractor, which did help, and then when he drove back, it flared up again. So, the pain level can go up to a 10/10, but for now, most of the time it is a 2/10. He is not a candidate for Cymbalta or Topamax due to a history of glaucoma. He states using his inversion table and doing exercise in the pool helps. At physical therapy, they were using a cervical traction unit, which was very effective. Physical therapy is recommending that he have one for home use. He denies dropping things, balance dysfunction, or any dexterity problems or weakness. He denies fever and bowel or bladder dysfunction. PHYSICAL EXAM:  Mr. Deandra Sheth is a 46-year-old male. He is alert and oriented. He has a normal mood and affect.   He has a full weightbearing, non-antalgic gait using no assistive device. He has 5/5 strength of his upper extremities, negative Waggoners, and normal tandem gait. ASSESSMENT/PLAN:  This is a patient with cervical disc protrusion/herniation, degenerative disc disease. He has finished his physical therapy and will continue with a home exercise program.  I will put in an order for a cervical home traction unit. He has enough Gabapentin. We will see him back in two months for reevaluation. Review of systems:    History reviewed. No pertinent past medical history. History reviewed. No pertinent surgical history.   Social History     Socioeconomic History    Marital status: UNKNOWN     Spouse name: Not on file    Number of children: Not on file    Years of education: Not on file    Highest education level: Not on file   Occupational History    Not on file   Social Needs    Financial resource strain: Not on file    Food insecurity:     Worry: Not on file     Inability: Not on file    Transportation needs:     Medical: Not on file     Non-medical: Not on file   Tobacco Use    Smoking status: Former Smoker     Last attempt to quit: 7/3/1999     Years since quittin.1    Smokeless tobacco: Never Used   Substance and Sexual Activity    Alcohol use: Not on file    Drug use: Not on file    Sexual activity: Not on file   Lifestyle    Physical activity:     Days per week: Not on file     Minutes per session: Not on file    Stress: Not on file   Relationships    Social connections:     Talks on phone: Not on file     Gets together: Not on file     Attends Worship service: Not on file     Active member of club or organization: Not on file     Attends meetings of clubs or organizations: Not on file     Relationship status: Not on file    Intimate partner violence:     Fear of current or ex partner: Not on file     Emotionally abused: Not on file     Physically abused: Not on file     Forced sexual activity: Not on file   Other Topics Concern    Not on file   Social History Narrative    Not on file     History reviewed. No pertinent family history. Physical Exam:  Visit Vitals  /79   Pulse 88   Temp 98.5 °F (36.9 °C) (Oral)   Resp 18   Ht 5' 7\" (1.702 m)   Wt 250 lb 6.4 oz (113.6 kg)   SpO2 97%   BMI 39.22 kg/m²     Pain Scale: 2/10       has been . reviewed and is appropriate          Diagnoses and all orders for this visit:    1. HNP (herniated nucleus pulposus), cervical  -     AMB SUPPLY ORDER    2. DDD (degenerative disc disease), cervical  -     AMB SUPPLY ORDER            Follow-up and Dispositions    · Return in about 2 months (around 10/7/2019) for with Dr Phuong Senior.              We have informed Yi Hobbs to notify us for immediate appointment if he has any worsening neurogical symptoms or if an emergency situation presents, then call 911

## 2019-09-20 NOTE — PROGRESS NOTES
[facility] [address]  [address] [facility] [address], [address] [phone] fax    Discharge Summary  Patient name: Lino Ceballos Start of Care: 2019   Referral Ann Bennett MD LTX:                 Medical Diagnosis: Cervicalgia [M54.2]  Other cervical disc displacement, unspecified cervical region [M50.20]  Payor: BLUE CROSS / Plan: [address] [place] [address] / Product Type: PPO /  Onset Date:1 month                Treatment Diagnosis: neck pain   Prior Hospitalization: see medical history Provider#: [id number]   Medications: Verified on Patient summary List    Comorbidities: DM, HTN   Prior Level of Function: works as , functionally independent     Visits from Start of Care: 4                                      Missed Visits: 0      Reporting Period : 19 to 19    Established Goals:                       Short Term Goals: To be accomplished in 1 weeks:  1. Therapist to establish HEP for strength and ROM to improve ease with ADLs.             MET      Long Term Goals: To be accomplished in 4 treatments:  1. Patient will be independent with HEP to improve carryover of functional gains with ADLs between visits.             Eval Status:n/a              MET  2. Pt will increase cervical rotation to the right by 15 degrees to increase ease with driving/ADLs.             Eval Status:              Cervical rotation right: 45 deg               Cervical rotation left: 30 deg             PROGRESSING: left 36 deg; right 53 deg  3. Pt will increase FOTO score to 70 points to demostrate improved function.              Eval Status: FOTO: 54              MET: 72  4.  Pt will report decrease in radicular symptoms into right UE by 50% to demonstrate centralization of symptoms and to improve ADL performance.               Eval Status:n/a              PROGRESSIN% improvement with frequency and severity of radicular symptoms               Key Functional Changes:   Functional Gains: Reduced frequency and severity of radicular symptoms, improved ability to don shoes and socks, improved tolerance and rotation with driving  Functional Deficits: Riding motorcycle, pain with prolonged periods in car  % improvement: 65%  Pain   Average: 2/10                  Best: 1/10                Worst: 10/10    Assessment/Summary of care: patient discharged to [facility] and maintenance with home traction unit.     RECOMMENDATIONS:  [x]Discontinue therapy: [x]Patient has reached or is progressing toward set goals      []Patient is non-compliant or has abdicated      []Due to lack of appreciable progress towards set [address], PT 9/20/2019 1:49 PM

## 2022-03-19 PROBLEM — E66.01 SEVERE OBESITY (HCC): Status: ACTIVE | Noted: 2019-07-03

## 2022-11-18 ENCOUNTER — TELEPHONE (OUTPATIENT)
Dept: ORTHOPEDIC SURGERY | Age: 57
End: 2022-11-18

## 2022-11-18 ENCOUNTER — OFFICE VISIT (OUTPATIENT)
Dept: ORTHOPEDIC SURGERY | Age: 57
End: 2022-11-18
Payer: COMMERCIAL

## 2022-11-18 VITALS
WEIGHT: 245 LBS | HEIGHT: 67 IN | HEART RATE: 89 BPM | BODY MASS INDEX: 38.45 KG/M2 | OXYGEN SATURATION: 96 % | TEMPERATURE: 98 F | RESPIRATION RATE: 18 BRPM

## 2022-11-18 DIAGNOSIS — M47.816 SPONDYLOSIS WITHOUT MYELOPATHY OR RADICULOPATHY, LUMBAR REGION: ICD-10-CM

## 2022-11-18 DIAGNOSIS — R29.898 RUE WEAKNESS: ICD-10-CM

## 2022-11-18 DIAGNOSIS — M47.816 SPONDYLOSIS WITHOUT MYELOPATHY OR RADICULOPATHY, LUMBAR REGION: Primary | ICD-10-CM

## 2022-11-18 DIAGNOSIS — M54.12 CERVICAL NEURITIS: ICD-10-CM

## 2022-11-18 PROCEDURE — 99204 OFFICE O/P NEW MOD 45 MIN: CPT | Performed by: PHYSICAL MEDICINE & REHABILITATION

## 2022-11-18 PROCEDURE — 72040 X-RAY EXAM NECK SPINE 2-3 VW: CPT | Performed by: PHYSICAL MEDICINE & REHABILITATION

## 2022-11-18 RX ORDER — METHYLPREDNISOLONE 4 MG/1
TABLET ORAL
Qty: 1 DOSE PACK | Refills: 0 | Status: SHIPPED | OUTPATIENT
Start: 2022-11-18

## 2022-11-18 RX ORDER — FLASH GLUCOSE SENSOR
KIT MISCELLANEOUS
COMMUNITY
Start: 2022-10-14

## 2022-11-18 RX ORDER — EMPAGLIFLOZIN 25 MG/1
25 TABLET, FILM COATED ORAL DAILY
COMMUNITY
Start: 2022-11-01

## 2022-11-18 RX ORDER — GABAPENTIN 300 MG/1
300 CAPSULE ORAL 3 TIMES DAILY
Qty: 90 CAPSULE | Refills: 1 | Status: SHIPPED | OUTPATIENT
Start: 2022-11-18

## 2022-11-18 RX ORDER — TIRZEPATIDE 2.5 MG/.5ML
INJECTION, SOLUTION SUBCUTANEOUS
COMMUNITY
Start: 2022-11-07

## 2022-11-18 NOTE — TELEPHONE ENCOUNTER
Dr Cotton would like to give this patient a MDP but due to his diabetes, he needs the ok from [facility]. I called his office and spoke with his nurse who talked to him and he gave the ok to move forward.

## 2022-11-18 NOTE — PROGRESS NOTES
[facility]  Carli Singleton [address]  [facility], [address]  Phone: [phone]  Fax: [phone]        INITIAL CONSULTATION      HISTORY OF PRESENT ILLNESS:  Car Miller is a 65 y.o. male whom is self-referred secondary to  right shoulder pain radiating to RUE down the arm to digits 4 and 5. Patient also notes some paresthesia in digits 2-3, and some stiffness in his first digit. Cristina Stands He rates his pain a 4-10/10. Patient reports his pain has improved in the last month. His pain is exacerbated by lying on his left side. Patient says his RUE pain was not present until about a month ago with no injury or trauma. Patient says he finished the PT from 2019, but never received the traction unit. Patient does his HEP 2-3 x a week. Patient says ran out of the Gabapentin 300 mg TID. Patient denies recent fevers, weight loss, rashes or skin lesions. No stomach ulcers or bleeding disorders. No history of spinal surgery or injections. Patient denies physical therapy or chiropractic care since 2019. Patient reports he has recently been taking a little gabapentin as needed for his pain. Patient is currently taking Citalopram for his depression. The patient has a history of DM and reports blood sugars are well controlled, consistently remaining below 200, and is monitored by Marisol Oliver, PCP. Patient's pain is relieved when putting his right hand above his head. PmHx of former smoker, DM, depression, glaucoma, obesity. Patient was previously seen by me on 7/12/2019 with c/o progressive neck pain extending into the RUE to digits 1, 2, and 3. Previously rated pain a 6/10. Treated with MDP with no relief. No h/o spinal surgery, injections, or Pt/chiropractic care. Not a candidate for Cymbalta and Topamax. tried him on Gabapentin 300 mg TID. Referred him to PT. Note from Carlso Carey 8/7/2019 follow up for previous appointment with me pain reduced from 6/10 to 2/10.  Relief with traction at PT and recommended one for home use. Follow up in 2 months time and failed to follow up. Cervical spine MRI dated  reviewed. Per report, broad-based disc-osteophyte protrusions at C5-6 and C6-7 levels producing foraminal narrowing. Most pronounced at C6-7 level on the right side. Additional disc-osteophyte protrusion at C7-T1 resulting in right neuralforaminal narrowing. The patient is RHD.  reviewed. Body mass index is 38.37 kg/m². PCP: ASHLEY Edge    History reviewed. No pertinent past medical history. History reviewed. No pertinent surgical history. Social History     Tobacco Use    Smoking status: Former     Packs/day: 0.50     Years: 20.00     Pack years: 10.00     Types: Cigarettes     Quit date: 7/3/1999     Years since quittin.3    Smokeless tobacco: Never   Substance Use Topics    Alcohol use: Not on file       Work status: The patient is employed in construction. Marital status: N/A . Current Outpatient Medications   Medication Sig Dispense Refill    Jardiance 25 mg tablet Take 25 mg by mouth daily. FreeStyle Mayito 2 Sensor kit apply 1 SENSOR to back OF UPPER ARM REMOVE AND REPLACE every 14 d. ..  (REFER TO PRESCRIPTION NOTES). Mounjaro 2.5 mg/0.5 mL pnij AS DIRECTED SUBCUTANEOUSLY WEEKLY      gabapentin (NEURONTIN) 300 mg capsule Take 1 Cap by mouth three (3) times daily. Max Daily Amount: 900 mg. 300 Cap 1    diclofenac EC (VOLTAREN) 75 mg EC tablet Take  by mouth. atorvastatin (LIPITOR) 20 mg tablet Take  by mouth daily. lisinopril-hydroCHLOROthiazide (PRINZIDE, ZESTORETIC) 20-12.5 mg per tablet Take  by mouth daily. omeprazole (PRILOSEC) 20 mg capsule Take 20 mg by mouth daily. metFORMIN (GLUCOPHAGE) 1,000 mg tablet Take 1,000 mg by mouth two (2) times daily (with meals). travoprost (TRAVATAN Z) 0.004 % ophthalmic solution Administer 1 Drop to both eyes every evening.       insulin glargine (LANTUS,BASAGLAR) 100 unit/mL (3 mL) inpn by SubCUTAneous route. cyclobenzaprine (FLEXERIL) 10 mg tablet Take  by mouth three (3) times daily as needed for Muscle Spasm(s). citalopram (CELEXA) 10 mg tablet Take  by mouth daily. (Patient not taking: Reported on 11/18/2022)      semaglutide (OZEMPIC SC) by SubCUTAneous route. methylPREDNISolone (MEDROL DOSEPACK) 4 mg tablet Per dose pack instructions (Patient not taking: Reported on 11/18/2022) 1 Dose Pack 0       No Known Allergies       History reviewed. No pertinent family history. REVIEW OF SYSTEMS  Constitutional symptoms: Negative  Eyes: Negative  Ears, Nose, Throat, and Mouth: Negative  Cardiovascular: Negative  Respiratory: Negative  Genitourinary: Negative  Integumentary (Skin and/or breast): Negative  Musculoskeletal: Positive for pain right shoulder pain radiating to RUE down the arm to digits 4 and 5. Patient also notes some paresthesia in digits 2-3, and some stiffness in his first digit. Extremities: Negative for edema. Endocrine/Rheumatologic: Negative  Hematologic/Lymphatic: Negative  Allergic/Immunologic: Negative  Psychiatric: Negative       PHYSICAL EXAMINATION  Visit Vitals  Pulse 89   Temp 98 °F (36.7 °C) (Temporal)   Resp 18   Ht 5' 7\" (1.702 m)   Wt 245 lb (111.1 kg)   SpO2 96%   BMI 38.37 kg/m²       CONSTITUTIONAL: NAD, A&O x 3  HEART: Regular rate and rhythm  GASTROINTESTINAL: Positive bowel sounds, soft, nontender, and nondistended  LUNGS: Clear to auscultation bilaterally. SKIN: Negative for rash. RANGE OF MOTION: The patient has full passive range of motion in all four extremities. SENSATION: Decreased sensation to light touch on the right for digits 1, 4, and 5 distribution. Otherwise, sensation is intact to light touch throughout. MOTOR:   Straight Leg Raise: Negative, bilateral  Waggoner: Negative, bilateral  Tandem Gait: Neg.    Deep tendon reflexes are 3 at the right biceps and 0  at the left biceps, 3 at the right brachioradialis and 0 at the left brachioradialis and 3 at the right triceps and 0 at the left triceps. Deep tendon reflexes are 0 at the left knee and 3 at the right knee and 0 at the right ankles and 3 at the left ankles. Ambulates without assistive device. Shoulder AB/Flex Elbow Flex Wrist Ext Elbow Ext Wrist Flex Hand Intrin Tone   Right +4/5 +4/5 +4/5 4/5 4/5 4/5 +4/5   Left +4/5 +4/5 +4/5 +4/5 +4/5 +4/5 +4/5              Hip Flex Knee Ext Knee Flex Ankle DF GTE Ankle PF Tone   Right +4/5 +4/5 +4/5 +4/5 +4/5 +4/5 +4/5   Left +4/5 +4/5 +4/5 +4/5 +4/5 +4/5 +4/5     RADIOGRAPHS  C plain films dated 11/18/2022. 2 views: AP and lateral. Revealed:  Minimal degenerative changes. No acute pathology identified. ASSESSMENT   Diagnoses and all orders for this visit:    1. Spondylosis without myelopathy or radiculopathy, lumbar region  -     AMB POC XRAY, SPINE, CERVICAL; 2 OR 3    2. Cervical neuritis  -     AMB POC XRAY, SPINE, CERVICAL; 2 OR 3    3. RUE weakness  -     AMB POC XRAY, SPINE, CERVICAL; 2 OR 3         IMPRESSIONS/RECOMMENDATIONS:  Patient presents today with c/o pain right shoulder pain radiating to RUE down the arm to digits 4 and 5. Patient also notes some paresthesia in digits 2-3, and some stiffness in his first digit. I will order a STAT new C spine MRI. I advised patient to bring copies of films to next visit. I will start him on Medrol Dosepak with approval from Katherin Grangerma, [facility]. Multiple treatment options were discussed. I will see the patient back after the MRI or if I see the MRI before the next appointment I may refer him to Dr. Hannah Juan before our next meeting, or earlier if needed. Written by Jaja Cervantes, as dictated by Yecenia Robert MD.  I, Dr. Yecenia Robert, examined the patient, reviewed and agree with the note.

## 2022-11-23 ENCOUNTER — HOSPITAL ENCOUNTER (OUTPATIENT)
Dept: MRI IMAGING | Age: 57
Discharge: HOME | End: 2022-11-23
Attending: PHYSICAL MEDICINE & REHABILITATION
Payer: COMMERCIAL

## 2022-11-23 ENCOUNTER — TELEPHONE (OUTPATIENT)
Dept: ORTHOPEDIC SURGERY | Age: 57
End: 2022-11-23

## 2022-11-23 DIAGNOSIS — M54.12 CERVICAL NEURITIS: ICD-10-CM

## 2022-11-23 DIAGNOSIS — M47.816 SPONDYLOSIS WITHOUT MYELOPATHY OR RADICULOPATHY, LUMBAR REGION: ICD-10-CM

## 2022-11-23 DIAGNOSIS — M54.2 NECK PAIN: Primary | ICD-10-CM

## 2022-11-23 DIAGNOSIS — R29.898 RUE WEAKNESS: ICD-10-CM

## 2022-11-23 PROCEDURE — 72141 MRI NECK SPINE W/O DYE: CPT

## 2022-11-23 NOTE — TELEPHONE ENCOUNTER
Patient contacted and told that Dr Lala Mccoy reviewed his MRI report and would like him to see Dr Hilary Browne. An order was entered.

## 2023-01-22 DIAGNOSIS — M54.12 CERVICAL NEURITIS: ICD-10-CM

## 2023-01-22 DIAGNOSIS — M47.816 SPONDYLOSIS WITHOUT MYELOPATHY OR RADICULOPATHY, LUMBAR REGION: ICD-10-CM

## 2023-01-22 DIAGNOSIS — R29.898 RUE WEAKNESS: ICD-10-CM

## 2023-01-23 RX ORDER — GABAPENTIN 300 MG/1
CAPSULE ORAL
Qty: 90 CAPSULE | Refills: 1 | Status: SHIPPED | OUTPATIENT
Start: 2023-01-23

## 2023-01-25 ENCOUNTER — OFFICE VISIT (OUTPATIENT)
Dept: ORTHOPEDIC SURGERY | Age: 58
End: 2023-01-25
Payer: COMMERCIAL

## 2023-01-25 VITALS
OXYGEN SATURATION: 96 % | BODY MASS INDEX: 39.55 KG/M2 | TEMPERATURE: 97.1 F | HEART RATE: 89 BPM | SYSTOLIC BLOOD PRESSURE: 108 MMHG | RESPIRATION RATE: 18 BRPM | WEIGHT: 252 LBS | DIASTOLIC BLOOD PRESSURE: 73 MMHG | HEIGHT: 67 IN

## 2023-01-25 DIAGNOSIS — R29.898 WEAKNESS OF RIGHT UPPER EXTREMITY: ICD-10-CM

## 2023-01-25 DIAGNOSIS — M54.12 CERVICAL RADICULOPATHY: ICD-10-CM

## 2023-01-25 DIAGNOSIS — G62.9 PERIPHERAL POLYNEUROPATHY: ICD-10-CM

## 2023-01-25 DIAGNOSIS — R94.131 ABNORMAL EMG: ICD-10-CM

## 2023-01-25 DIAGNOSIS — R20.0 NUMBNESS AND TINGLING OF RIGHT UPPER EXTREMITY: ICD-10-CM

## 2023-01-25 DIAGNOSIS — R20.0 NUMBNESS AND TINGLING OF RIGHT UPPER EXTREMITY: Primary | ICD-10-CM

## 2023-01-25 DIAGNOSIS — R20.2 NUMBNESS AND TINGLING OF RIGHT UPPER EXTREMITY: ICD-10-CM

## 2023-01-25 DIAGNOSIS — R20.2 NUMBNESS AND TINGLING OF RIGHT UPPER EXTREMITY: Primary | ICD-10-CM

## 2023-01-25 PROCEDURE — 95886 MUSC TEST DONE W/N TEST COMP: CPT | Performed by: PHYSICAL MEDICINE & REHABILITATION

## 2023-01-25 PROCEDURE — 95909 NRV CNDJ TST 5-6 STUDIES: CPT | Performed by: PHYSICAL MEDICINE & REHABILITATION

## 2023-01-25 NOTE — PROGRESS NOTES
[address]  [address], [address]  [address]  Phone: [phone]  Fax: [phone]        Henrry Mauro  : 1965  PCP: Milton Denson [place]  2023    ELECTROMYOGRAPHY AND NERVE CONDUCTION STUDIES    Julio C Caro was referred by Dr. Myrna Avila for electrodiagnostic evaluation of weakness, numbness and tingling of RUE. NCV & EMG Findings:  Evaluation of the right median (APB) motor nerve showed decreased conduction velocity (42 m/s). The right ulnar (ADM) motor nerve showed reduced amplitude (4.7 mV), decreased conduction velocity (Bel Elbow-Wrist, 49 m/s), and decreased conduction velocity (Abv Elbow-Bel Elbow, 38 m/s). The right ulnar sensory nerve showed reduced amplitude (5 µV). All remaining nerves (as indicated in the following tables) were within normal limits. INTERPRETATION  This is an abnormal electrodiagnostic examination. These findings may be consistent with:   Cervical radiculopathy (C7, C8/T1) - This is based on findings of muscle membrane instability in the C7 and C8 myotomes  Length-dependent sensorimotor peripheral polyneuropathy - this is based on segmental slowing in non-compressible segments and abnormal F-wave study    There are no electrodiagnostic findings consistent with , brachial plexopathy, myopathy, mononeuropathy or any other cervical radiculopathy or polyneuropathy. CLINICAL INTERPRETATION  His electrodiagnostic findings of cervical radiculopathy may be consistent with his right arm weakness. However, there are relatively mild electrodiagnostic findings in the [facility], which he is barely able to activate, which raises the question of central weakness. The other area of dense weakness is within the ulnar nerve distribution, which is well demonstrated electrodiagnostically and consistent with a potential fascicular injury of the C8/T1 nerve roots.        HISTORY OF PRESENT ILLNESS  Ramakrishna Navas Tomasa Jung is a 65 y.o. male. Pt presents today with RUE EMG evaluation for weakness, numbness and tingling of right arm. Denies traumatic injury to area. Sxs started 3 years ago, where he had numbness of his index finger in his right hand and went through PT for neck pain and sxs. However, recently, he started having sharp pain at the right shoulder that radiated down his RUE, where sxs are also experienced at [facility] now. Notes that weakness of right hand started in 10/2022. Pt notes he doesn't currently feel numbness and tingling, due to Gabapentin regimen. Pt notes he has difficulty moving hand completely due to weakness, but is able to move hs thumb. 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 Pt also has hx of DM for the last 8 years, and notes condition has improved since change in treatment. Denies hx of stroke. Finger extension elicits the most motor weakness. PAST MEDICAL HISTORY   History reviewed. No pertinent past medical history. History reviewed. No pertinent surgical history. Robinson Sage MEDICATIONS      Current Outpatient Medications   Medication Sig Dispense Refill    gabapentin (NEURONTIN) 300 mg capsule take 1 tablet by mouth three times a day 90 Capsule 1    Jardiance 25 mg tablet Take 25 mg by mouth daily. FreeStyle [place]ito 2 Sensor kit apply 1 SENSOR to back OF UPPER ARM REMOVE AND REPLACE every 14 d. ..  (REFER TO PRESCRIPTION NOTES). Mounjaro 2.5 mg/0.5 mL pnij AS DIRECTED SUBCUTANEOUSLY WEEKLY      methylPREDNISolone (MEDROL DOSEPACK) 4 mg tablet Per dose pack instructions 1 Dose Pack 0    diclofenac EC (VOLTAREN) 75 mg EC tablet Take  by mouth. cyclobenzaprine (FLEXERIL) 10 mg tablet Take  by mouth three (3) times daily as needed for Muscle Spasm(s). citalopram (CELEXA) 10 mg tablet Take  by mouth daily. (Patient not taking: Reported on 11/18/2022)      atorvastatin (LIPITOR) 20 mg tablet Take  by mouth daily.       lisinopril-hydroCHLOROthiazide (PRINZIDE, ZESTORETIC) 20-12.5 mg per tablet Take  by mouth daily. omeprazole (PRILOSEC) 20 mg capsule Take 20 mg by mouth daily. metFORMIN (GLUCOPHAGE) 1,000 mg tablet Take 1,000 mg by mouth two (2) times daily (with meals). travoprost (TRAVATAN Z) 0.004 % ophthalmic solution Administer 1 Drop to both eyes every evening. insulin glargine (LANTUS,BASAGLAR) 100 unit/mL (3 mL) inpn by SubCUTAneous route. semaglutide (OZEMPIC SC) by SubCUTAneous route. methylPREDNISolone (MEDROL DOSEPACK) 4 mg tablet Per dose pack instructions (Patient not taking: Reported on 2022) 1 Dose Pack 0        ALLERGIES  No Known Allergies       SOCIAL HISTORY    Social History     Socioeconomic History    Marital status: UNKNOWN   Tobacco Use    Smoking status: Former     Packs/day: 0.50     Years: 20.00     Pack years: 10.00     Types: Cigarettes     Quit date: 7/3/1999     Years since quittin.5    Smokeless tobacco: Never   Vaping Use    Vaping Use: Never used   Substance and Sexual Activity    Drug use: Never     Social Determinants of Health     Physical Activity: Unknown    Days of Exercise per Week: 2 days       FAMILY HISTORY  History reviewed. No pertinent family history. PHYSICAL EXAMINATION  Visit Vitals  /73 (BP 1 Location: Right upper arm, BP Patient Position: Sitting)   Pulse 89   Temp 97.1 °F (36.2 °C) (Temporal)   Resp 18   Ht 5' 7\" (1.702 m)   Wt 252 lb (114.3 kg)   SpO2 96%   BMI 39.47 kg/m²       Pain Assessment  2023   Location of Pain Arm;Hand   Location Modifiers -   Severity of Pain 0   Quality of Pain Other (Comment)   Quality of Pain Comment weakness   Duration of Pain Persistent   Frequency of Pain Constant   Aggravating Factors Other (Comment)   Aggravating Factors Comment -   Limiting Behavior Some   Relieving Factors Nothing   Relieving Factors Comment -   Result of Injury No           Constitutional:  Well developed, well nourished, in no acute distress.    Psychiatric: Affect and mood are appropriate. Integumentary: No rashes or abrasions noted on exposed areas. SPINE/MUSCULOSKELETAL EXAM    On brief examination: Finger extension of RUE elicits the most motor weakness. .      NCV & EMG Findings:  NCS+  Motor Nerve Results      Latency Amplitude F-Lat Segment Distance CV Comment   Site (ms) Norm (mV) Norm (ms)  (cm) (m/s) Norm    Right Median (APB) Motor   Wrist 4.4  < 4.7 5.1  > 4.2 36.6 Wrist-APB 8      Elbow 10.2 - 3.3 -  Elbow-Wrist 24.5 42  > 47    Right Ulnar (ADM) Motor   Wrist 3.0  < 3.7 4.7  > 7.9  Wrist-ADM 8      Bel Elbow 7.0 - 3.8 -  Bel Elbow-Wrist 19.5 49  > 52    Abv Elbow 9.6 - 3.5 -  Abv Elbow-Bel Elbow 10 38  > 43      Sensory Sites       Latency (Onset) Latency (Peak)  Amplitude (O-P) Segment Distance CV (Onset) Comment   Site ms Norm (ms) Norm µV Norm  mm m/s Norm    Right Median Sensory   Wrist-Dig II 2.9  < 3.3 3.7  < 4.0 9  > 7 Wrist-Dig II 14 48 -    Right Radial Sensory   Forearm-Wrist 1.55  < 2.2 2.1  < 2.8 36  > 7 Forearm-Wrist 10 65 -    Right Ulnar Sensory   Wrist-Dig V 2.7  < 3.1 3.2  < 4.0 5  > 7 Wrist-Dig V 14 52 -      EMG+     Side Muscle Nerve Root Ins Act Fibs Psw Fascics Other Amp Dur Poly Recrt Activation Comment Misc   Right Biceps Musculocut C5-C6 Nml Nml Nml Nml 0 Nml Nml 0 Nml Nml     Right Triceps Radial C6-C8 Nml Nml Nml Nml 0 2+ Nml 0 Nml Nml     Right Pronator Teres Median C6-C7 Incr 1+ 3+ Nml 0 Nml Nml 1+ Nml Nml     Right EDC Radial,  Posterior In. .. C7-C8 Incr Nml 1+ Nml 0 Nml Nml 1+ Nml Nml     Right ADM Ulnar C8-T1 Incr 1+ 3+ Nml 0 Nml Nml 0 Nml Nml     Right APB Median C8-T1 Nml Nml Nml Nml 0 Nml Nml 0 Nml Nml     Right FDI Median,  Ulnar C8-T1 Incr 1+ 3+ Nml 0 Nml Nml 0 Nml Nml     Right Pronator Quad Median,  Anterior Int. ..  C7-C8 Incr 2+ 4+ Nml 0 Nml Nml 0 Mod Red Nml     Right Cervical Parasp (Upper) Rami C1-C3 Nml Nml Nml Nml 0          Right Cervical Parasp (Mid) Rami C4-C6 Nml Nml Nml Nml 0          Right Cervical Parasp (Lower) Rami C7-C8 Nml Nml Nml Nml 0                  Waveforms:    Motor         F-Wave       Sensory                   [facility]  OFFICE PROCEDURE PROGRESS NOTE        Chart reviewed for the following:   Rock RODRIGUEZ, have reviewed the History, Physical and updated the Allergic reactions for [address] performed immediately prior to start of procedure:   Wesley Trejo, have performed the following reviews on Denise Cage prior to the start of the procedure:            * Patient was identified by name and date of birth   * Agreement on procedure being performed was verified  * Risks and Benefits explained to the patient  * Procedure site verified and marked as necessary  * Patient was positioned for comfort  * Consent was signed and verified     Time: 2:28 PM    Date of procedure: 1/25/2023    Procedure performed by:  Reg Manuel MD    Provider accompanied by: Franklin.     Patient accompanied by another individual: No    How tolerated by patient: tolerated the procedure well with no complications    Post Procedural Pain Scale: 0 - No Hurt    Comments: none    Written by Rafa Cabral as dictated by Rock Tsang MD

## 2023-02-03 ENCOUNTER — HOSPITAL ENCOUNTER (OUTPATIENT)
Dept: LAB | Age: 58
Discharge: HOME | End: 2023-02-03

## 2023-02-03 ENCOUNTER — TRANSCRIBE ORDER (OUTPATIENT)
Dept: REGISTRATION | Age: 58
End: 2023-02-03

## 2023-02-03 ENCOUNTER — HOSPITAL ENCOUNTER (OUTPATIENT)
Dept: PREADMISSION TESTING | Age: 58
End: 2023-02-03
Payer: COMMERCIAL

## 2023-02-03 DIAGNOSIS — M48.02 SPINAL STENOSIS IN CERVICAL REGION: Primary | ICD-10-CM

## 2023-02-03 DIAGNOSIS — M48.02 SPINAL STENOSIS IN CERVICAL REGION: ICD-10-CM

## 2023-02-03 LAB — SENTARA SPECIMEN COL,SENBCF: NORMAL

## 2023-02-03 PROCEDURE — 93005 ELECTROCARDIOGRAM TRACING: CPT

## 2023-02-03 PROCEDURE — 99001 SPECIMEN HANDLING PT-LAB: CPT

## 2023-02-04 LAB
ATRIAL RATE: 79 BPM
CALCULATED P AXIS, ECG09: 68 DEGREES
CALCULATED R AXIS, ECG10: -6 DEGREES
CALCULATED T AXIS, ECG11: 63 DEGREES
DIAGNOSIS, 93000: NORMAL
P-R INTERVAL, ECG05: 180 MS
Q-T INTERVAL, ECG07: 362 MS
QRS DURATION, ECG06: 98 MS
QTC CALCULATION (BEZET), ECG08: 415 MS
VENTRICULAR RATE, ECG03: 79 BPM

## 2023-02-13 ENCOUNTER — ANESTHESIA EVENT (OUTPATIENT)
Facility: HOSPITAL | Age: 58
End: 2023-02-13
Payer: COMMERCIAL

## 2023-02-13 NOTE — H&P
Pre-Admission History and Physical    Patient: Clark Dallas   MRN: [id number]   SSN: [id number]   YOB: 1965   Age: 65 y.o. Sex: male     Patient scheduled for: Anterior Cervical Decompression Fusion C7-T1. Date of surgery: 02/15/2023. Surgeon: Claus Starks MD    HPI:  Clark Dallas is a 65 y.o. male with dramatic right hand weakness (dominant hand). He reports a pain level of 0/10. MRI demonstrates has severe foraminal stenosis at C7-T1 on the right. EMG demonstrates clear C8 radiculopathy as well as sensorimotor polyneuropathy. Loss of function and weakness has impacted the patient's functional ability. He is being admitted for surgical intervention. Past Medical History:   Diagnosis Date    Anxiety     Arthritis     Cancer ([facility] [address]     skin    Diabetes mellitus ([facility] [address]     Hypercholesteremia     Hypertension     JOHN (obstructive sleep apnea)     does not use c pap any longer    Wears glasses      Social History     Socioeconomic History    Marital status: Unknown   Tobacco Use    Smoking status: Former     Types: Cigarettes     Quit date:      Years since quittin.1    Smokeless tobacco: Never   Vaping Use    Vaping Use: Never used   Substance and Sexual Activity    Alcohol use: Yes     Alcohol/week: 5.0 standard drinks     Types: 5 Shots of liquor per week    Drug use: Never     Social Determinants of Health     Physical Activity: Unknown    Days of Exercise per Week: 2 days   Intimate Partner Violence: Not At Risk    Fear of Current or Ex-Partner: No    Emotionally Abused: No    Physically Abused: No    Sexually Abused: No     Past Surgical History:   Procedure Laterality Date    COLONOSCOPY       No family history on file. No Known Allergies  No current facility-administered medications for this encounter.      Current Outpatient Medications   Medication Sig Dispense Refill    atorvastatin (LIPITOR) 40 MG tablet Take 40 mg by mouth daily      citalopram (CELEXA) 10 MG tablet Take by mouth daily      empagliflozin (JARDIANCE) 25 MG tablet Take 25 mg by mouth daily      gabapentin (NEURONTIN) 300 MG capsule 3 times daily. lisinopril (PRINIVIL;ZESTRIL) 10 MG tablet 20 mg daily      metFORMIN (GLUCOPHAGE) 1000 MG tablet Take 1,000 mg by mouth 2 times daily (with meals)      Tirzepatide (MOUNJARO) 2.5 MG/0.5ML SOPN SC injection every 7 days Every Saturday      diclofenac (VOLTAREN) 75 MG EC tablet Take by mouth daily      aspirin 81 MG EC tablet Take 81 mg by mouth daily      pioglitazone (ACTOS) 30 MG tablet Take 30 mg by mouth daily      Insulin Glargine (TOUJEO SOLOSTAR SC) Inject 20 mg into the skin daily      Potassium 99 MG TABS Take by mouth daily      vitamin B-12 (CYANOCOBALAMIN) 1000 MCG tablet Take 1,000 mcg by mouth daily      vitamin D 25 MCG (1000 UT) CAPS Take by mouth daily      Multiple Vitamin (MULTI-VITAMIN DAILY PO) Take by mouth daily      magnesium gluconate (MAGONATE) 500 MG tablet Take 500 mg by mouth daily      GLUCOSAMINE-CHONDROITIN DS PO Take by mouth daily         ROS:  Denies chills, fever,night sweats,  bowel or bladder dysfunction, unexplained weight loss/weight gain, chest pain, sob or anxiety. Physical Examination    Gen: Well developed, well nourished 65 y.o. male. Weakness of his intrinsics of his right hand, possibly polyneuropathy. Difficult to discern diffuse polyneuropathy vs. Radiculopathy on exam.     Assessment and Plan    Due to the pt's persistent symptoms  Sloane Mcdonald is being admitted to undergo surgical intervention. The post-operative plan of care consists of physical therapy/home health if indicated and a 2 week f/u office visit. The risks, benefits, complications and alternatives to surgery have been discussed in detail with the patient. The patient understands and agrees to proceed.

## 2023-02-15 ENCOUNTER — APPOINTMENT (OUTPATIENT)
Facility: HOSPITAL | Age: 58
End: 2023-02-15
Attending: ORTHOPAEDIC SURGERY
Payer: COMMERCIAL

## 2023-02-15 ENCOUNTER — ANESTHESIA (OUTPATIENT)
Facility: HOSPITAL | Age: 58
End: 2023-02-15
Payer: COMMERCIAL

## 2023-02-15 ENCOUNTER — HOSPITAL ENCOUNTER (OUTPATIENT)
Facility: HOSPITAL | Age: 58
Setting detail: OUTPATIENT SURGERY
Discharge: HOME | End: 2023-02-15
Attending: ORTHOPAEDIC SURGERY | Admitting: ORTHOPAEDIC SURGERY
Payer: COMMERCIAL

## 2023-02-15 VITALS
RESPIRATION RATE: 16 BRPM | HEIGHT: 67 IN | TEMPERATURE: 97.9 F | HEART RATE: 88 BPM | BODY MASS INDEX: 38.25 KG/M2 | DIASTOLIC BLOOD PRESSURE: 86 MMHG | OXYGEN SATURATION: 94 % | WEIGHT: 243.7 LBS | SYSTOLIC BLOOD PRESSURE: 140 MMHG

## 2023-02-15 DIAGNOSIS — Z98.1 STATUS POST CERVICAL SPINAL FUSION: Primary | ICD-10-CM

## 2023-02-15 LAB
ABO + RH BLD: NORMAL
BLOOD GROUP ANTIBODIES SERPL: NORMAL
GLUCOSE BLD STRIP.AUTO-MCNC: 139 MG/DL (ref 70–110)
GLUCOSE BLD STRIP.AUTO-MCNC: 148 MG/DL (ref 70–110)
SPECIMEN EXP DATE BLD: NORMAL

## 2023-02-15 PROCEDURE — 2500000003 HC RX 250 WO HCPCS: Performed by: ORTHOPAEDIC SURGERY

## 2023-02-15 PROCEDURE — 7100000010 HC PHASE II RECOVERY - FIRST 15 MIN: Performed by: ORTHOPAEDIC SURGERY

## 2023-02-15 PROCEDURE — 6360000002 HC RX W HCPCS

## 2023-02-15 PROCEDURE — 2709999900 HC NON-CHARGEABLE SUPPLY: Performed by: ORTHOPAEDIC SURGERY

## 2023-02-15 PROCEDURE — 6360000002 HC RX W HCPCS: Performed by: ORTHOPAEDIC SURGERY

## 2023-02-15 PROCEDURE — 2580000003 HC RX 258: Performed by: ORTHOPAEDIC SURGERY

## 2023-02-15 PROCEDURE — 7100000011 HC PHASE II RECOVERY - ADDTL 15 MIN: Performed by: ORTHOPAEDIC SURGERY

## 2023-02-15 PROCEDURE — 86850 RBC ANTIBODY SCREEN: CPT

## 2023-02-15 PROCEDURE — 3600000004 HC SURGERY LEVEL 4 BASE: Performed by: ORTHOPAEDIC SURGERY

## 2023-02-15 PROCEDURE — 7100000000 HC PACU RECOVERY - FIRST 15 MIN: Performed by: ORTHOPAEDIC SURGERY

## 2023-02-15 PROCEDURE — 3600000014 HC SURGERY LEVEL 4 ADDTL 15MIN: Performed by: ORTHOPAEDIC SURGERY

## 2023-02-15 PROCEDURE — C1889 IMPLANT/INSERT DEVICE, NOC: HCPCS | Performed by: ORTHOPAEDIC SURGERY

## 2023-02-15 PROCEDURE — 82962 GLUCOSE BLOOD TEST: CPT

## 2023-02-15 PROCEDURE — 3700000001 HC ADD 15 MINUTES (ANESTHESIA): Performed by: ORTHOPAEDIC SURGERY

## 2023-02-15 PROCEDURE — C1713 ANCHOR/SCREW BN/BN,TIS/BN: HCPCS | Performed by: ORTHOPAEDIC SURGERY

## 2023-02-15 PROCEDURE — C1729 CATH, DRAINAGE: HCPCS | Performed by: ORTHOPAEDIC SURGERY

## 2023-02-15 PROCEDURE — 7100000001 HC PACU RECOVERY - ADDTL 15 MIN: Performed by: ORTHOPAEDIC SURGERY

## 2023-02-15 PROCEDURE — 2500000003 HC RX 250 WO HCPCS

## 2023-02-15 PROCEDURE — 3700000000 HC ANESTHESIA ATTENDED CARE: Performed by: ORTHOPAEDIC SURGERY

## 2023-02-15 PROCEDURE — 2720000010 HC SURG SUPPLY STERILE: Performed by: ORTHOPAEDIC SURGERY

## 2023-02-15 PROCEDURE — 36415 COLL VENOUS BLD VENIPUNCTURE: CPT

## 2023-02-15 DEVICE — IMPLANTABLE DEVICE: Type: IMPLANTABLE DEVICE | Site: SPINE CERVICAL | Status: FUNCTIONAL

## 2023-02-15 DEVICE — SCREW SPNL L14MM DIA4MM SELF STARTING VAR ANT CERV TI OZARK: Type: IMPLANTABLE DEVICE | Site: SPINE CERVICAL | Status: FUNCTIONAL

## 2023-02-15 RX ORDER — SODIUM CHLORIDE 9 MG/ML
INJECTION, SOLUTION INTRAVENOUS PRN
Status: DISCONTINUED | OUTPATIENT
Start: 2023-02-15 | End: 2023-02-15 | Stop reason: HOSPADM

## 2023-02-15 RX ORDER — ACETAMINOPHEN 500 MG
1000 TABLET ORAL ONCE
Status: DISCONTINUED | OUTPATIENT
Start: 2023-02-16 | End: 2023-02-15 | Stop reason: HOSPADM

## 2023-02-15 RX ORDER — LABETALOL HYDROCHLORIDE 5 MG/ML
10 INJECTION, SOLUTION INTRAVENOUS
Status: DISCONTINUED | OUTPATIENT
Start: 2023-02-15 | End: 2023-02-15 | Stop reason: HOSPADM

## 2023-02-15 RX ORDER — DROPERIDOL 2.5 MG/ML
0.62 INJECTION, SOLUTION INTRAMUSCULAR; INTRAVENOUS
Status: DISCONTINUED | OUTPATIENT
Start: 2023-02-15 | End: 2023-02-15 | Stop reason: HOSPADM

## 2023-02-15 RX ORDER — FENTANYL CITRATE 50 UG/ML
INJECTION, SOLUTION INTRAMUSCULAR; INTRAVENOUS PRN
Status: DISCONTINUED | OUTPATIENT
Start: 2023-02-15 | End: 2023-02-15 | Stop reason: SDUPTHER

## 2023-02-15 RX ORDER — FENTANYL CITRATE 50 UG/ML
25 INJECTION, SOLUTION INTRAMUSCULAR; INTRAVENOUS EVERY 5 MIN PRN
Status: DISCONTINUED | OUTPATIENT
Start: 2023-02-15 | End: 2023-02-15 | Stop reason: HOSPADM

## 2023-02-15 RX ORDER — OXYCODONE HYDROCHLORIDE 5 MG/1
5 TABLET ORAL PRN
Status: DISCONTINUED | OUTPATIENT
Start: 2023-02-15 | End: 2023-02-15 | Stop reason: HOSPADM

## 2023-02-15 RX ORDER — OXYCODONE HYDROCHLORIDE 5 MG/1
5 TABLET ORAL EVERY 6 HOURS PRN
Qty: 28 TABLET | Refills: 0 | Status: SHIPPED | OUTPATIENT
Start: 2023-02-15 | End: 2023-02-22

## 2023-02-15 RX ORDER — SUCCINYLCHOLINE/SOD CL,ISO/PF 100 MG/5ML
SYRINGE (ML) INTRAVENOUS PRN
Status: DISCONTINUED | OUTPATIENT
Start: 2023-02-15 | End: 2023-02-15 | Stop reason: SDUPTHER

## 2023-02-15 RX ORDER — HYDRALAZINE HYDROCHLORIDE 20 MG/ML
10 INJECTION INTRAMUSCULAR; INTRAVENOUS
Status: DISCONTINUED | OUTPATIENT
Start: 2023-02-15 | End: 2023-02-15 | Stop reason: HOSPADM

## 2023-02-15 RX ORDER — SODIUM CHLORIDE 0.9 % (FLUSH) 0.9 %
5-40 SYRINGE (ML) INJECTION EVERY 12 HOURS SCHEDULED
Status: DISCONTINUED | OUTPATIENT
Start: 2023-02-15 | End: 2023-02-15 | Stop reason: HOSPADM

## 2023-02-15 RX ORDER — PHENYLEPHRINE HCL IN 0.9% NACL 1 MG/10 ML
SYRINGE (ML) INTRAVENOUS PRN
Status: DISCONTINUED | OUTPATIENT
Start: 2023-02-15 | End: 2023-02-15 | Stop reason: SDUPTHER

## 2023-02-15 RX ORDER — SODIUM CHLORIDE, SODIUM LACTATE, POTASSIUM CHLORIDE, CALCIUM CHLORIDE 600; 310; 30; 20 MG/100ML; MG/100ML; MG/100ML; MG/100ML
INJECTION, SOLUTION INTRAVENOUS CONTINUOUS
Status: DISCONTINUED | OUTPATIENT
Start: 2023-02-15 | End: 2023-02-15 | Stop reason: HOSPADM

## 2023-02-15 RX ORDER — VANCOMYCIN HYDROCHLORIDE 1 G/20ML
INJECTION, POWDER, LYOPHILIZED, FOR SOLUTION INTRAVENOUS PRN
Status: DISCONTINUED | OUTPATIENT
Start: 2023-02-15 | End: 2023-02-15 | Stop reason: ALTCHOICE

## 2023-02-15 RX ORDER — ONDANSETRON 2 MG/ML
INJECTION INTRAMUSCULAR; INTRAVENOUS PRN
Status: DISCONTINUED | OUTPATIENT
Start: 2023-02-15 | End: 2023-02-15 | Stop reason: SDUPTHER

## 2023-02-15 RX ORDER — INDOCYANINE GREEN AND WATER 25 MG
KIT INJECTION PRN
Status: DISCONTINUED | OUTPATIENT
Start: 2023-02-15 | End: 2023-02-15 | Stop reason: ALTCHOICE

## 2023-02-15 RX ORDER — GLYCOPYRROLATE 0.2 MG/ML
INJECTION INTRAMUSCULAR; INTRAVENOUS PRN
Status: DISCONTINUED | OUTPATIENT
Start: 2023-02-15 | End: 2023-02-15 | Stop reason: SDUPTHER

## 2023-02-15 RX ORDER — HYDROMORPHONE HYDROCHLORIDE 1 MG/ML
0.5 INJECTION, SOLUTION INTRAMUSCULAR; INTRAVENOUS; SUBCUTANEOUS EVERY 5 MIN PRN
Status: DISCONTINUED | OUTPATIENT
Start: 2023-02-15 | End: 2023-02-15 | Stop reason: HOSPADM

## 2023-02-15 RX ORDER — ONDANSETRON 2 MG/ML
4 INJECTION INTRAMUSCULAR; INTRAVENOUS
Status: DISCONTINUED | OUTPATIENT
Start: 2023-02-15 | End: 2023-02-15 | Stop reason: HOSPADM

## 2023-02-15 RX ORDER — KETAMINE HCL IN NACL, ISO-OSM 100MG/10ML
SYRINGE (ML) INJECTION PRN
Status: DISCONTINUED | OUTPATIENT
Start: 2023-02-15 | End: 2023-02-15 | Stop reason: SDUPTHER

## 2023-02-15 RX ORDER — DIPHENHYDRAMINE HYDROCHLORIDE 50 MG/ML
12.5 INJECTION INTRAMUSCULAR; INTRAVENOUS
Status: DISCONTINUED | OUTPATIENT
Start: 2023-02-15 | End: 2023-02-15 | Stop reason: HOSPADM

## 2023-02-15 RX ORDER — MIDAZOLAM HYDROCHLORIDE 1 MG/ML
INJECTION INTRAMUSCULAR; INTRAVENOUS PRN
Status: DISCONTINUED | OUTPATIENT
Start: 2023-02-15 | End: 2023-02-15 | Stop reason: SDUPTHER

## 2023-02-15 RX ORDER — OXYCODONE HYDROCHLORIDE 5 MG/1
10 TABLET ORAL PRN
Status: DISCONTINUED | OUTPATIENT
Start: 2023-02-15 | End: 2023-02-15 | Stop reason: HOSPADM

## 2023-02-15 RX ORDER — ESMOLOL HYDROCHLORIDE 10 MG/ML
INJECTION, SOLUTION INTRAVENOUS PRN
Status: DISCONTINUED | OUTPATIENT
Start: 2023-02-15 | End: 2023-02-15 | Stop reason: SDUPTHER

## 2023-02-15 RX ORDER — PROPOFOL 10 MG/ML
INJECTION, EMULSION INTRAVENOUS PRN
Status: DISCONTINUED | OUTPATIENT
Start: 2023-02-15 | End: 2023-02-15 | Stop reason: SDUPTHER

## 2023-02-15 RX ORDER — SODIUM CHLORIDE 0.9 % (FLUSH) 0.9 %
5-40 SYRINGE (ML) INJECTION PRN
Status: DISCONTINUED | OUTPATIENT
Start: 2023-02-15 | End: 2023-02-15 | Stop reason: HOSPADM

## 2023-02-15 RX ORDER — LIDOCAINE HYDROCHLORIDE 20 MG/ML
INJECTION, SOLUTION EPIDURAL; INFILTRATION; INTRACAUDAL; PERINEURAL PRN
Status: DISCONTINUED | OUTPATIENT
Start: 2023-02-15 | End: 2023-02-15 | Stop reason: SDUPTHER

## 2023-02-15 RX ORDER — ROCURONIUM BROMIDE 10 MG/ML
INJECTION, SOLUTION INTRAVENOUS PRN
Status: DISCONTINUED | OUTPATIENT
Start: 2023-02-15 | End: 2023-02-15 | Stop reason: SDUPTHER

## 2023-02-15 RX ORDER — DEXAMETHASONE SODIUM PHOSPHATE 4 MG/ML
INJECTION, SOLUTION INTRA-ARTICULAR; INTRALESIONAL; INTRAMUSCULAR; INTRAVENOUS; SOFT TISSUE PRN
Status: DISCONTINUED | OUTPATIENT
Start: 2023-02-15 | End: 2023-02-15 | Stop reason: SDUPTHER

## 2023-02-15 RX ORDER — LORAZEPAM 2 MG/ML
0.5 INJECTION INTRAMUSCULAR
Status: DISCONTINUED | OUTPATIENT
Start: 2023-02-15 | End: 2023-02-15 | Stop reason: HOSPADM

## 2023-02-15 RX ORDER — MEPERIDINE HYDROCHLORIDE 50 MG/ML
12.5 INJECTION INTRAMUSCULAR; INTRAVENOUS; SUBCUTANEOUS ONCE
Status: DISCONTINUED | OUTPATIENT
Start: 2023-02-15 | End: 2023-02-15 | Stop reason: HOSPADM

## 2023-02-15 RX ADMIN — FENTANYL CITRATE 50 MCG: 50 INJECTION, SOLUTION INTRAMUSCULAR; INTRAVENOUS at 10:02

## 2023-02-15 RX ADMIN — Medication 150 MG: at 10:05

## 2023-02-15 RX ADMIN — ROCURONIUM BROMIDE 5 MG: 10 INJECTION, SOLUTION INTRAVENOUS at 10:02

## 2023-02-15 RX ADMIN — FENTANYL CITRATE 50 MCG: 50 INJECTION, SOLUTION INTRAMUSCULAR; INTRAVENOUS at 10:12

## 2023-02-15 RX ADMIN — Medication 200 MCG: at 10:45

## 2023-02-15 RX ADMIN — GLYCOPYRROLATE 0.1 MG: 0.2 INJECTION INTRAMUSCULAR; INTRAVENOUS at 10:55

## 2023-02-15 RX ADMIN — Medication 20 MG: at 10:24

## 2023-02-15 RX ADMIN — Medication 100 MCG: at 10:59

## 2023-02-15 RX ADMIN — HYDROMORPHONE HYDROCHLORIDE 0.5 MG: 1 INJECTION, SOLUTION INTRAMUSCULAR; INTRAVENOUS; SUBCUTANEOUS at 11:10

## 2023-02-15 RX ADMIN — HYDROMORPHONE HYDROCHLORIDE 0.5 MG: 1 INJECTION, SOLUTION INTRAMUSCULAR; INTRAVENOUS; SUBCUTANEOUS at 11:13

## 2023-02-15 RX ADMIN — SODIUM CHLORIDE, SODIUM LACTATE, POTASSIUM CHLORIDE, AND CALCIUM CHLORIDE: 600; 310; 30; 20 INJECTION, SOLUTION INTRAVENOUS at 08:14

## 2023-02-15 RX ADMIN — SODIUM CHLORIDE, SODIUM LACTATE, POTASSIUM CHLORIDE, AND CALCIUM CHLORIDE: 600; 310; 30; 20 INJECTION, SOLUTION INTRAVENOUS at 10:36

## 2023-02-15 RX ADMIN — Medication 100 MCG: at 10:43

## 2023-02-15 RX ADMIN — MIDAZOLAM 2 MG: 1 INJECTION INTRAMUSCULAR; INTRAVENOUS at 09:59

## 2023-02-15 RX ADMIN — Medication 2000 MG: at 10:07

## 2023-02-15 RX ADMIN — ROCURONIUM BROMIDE 20 MG: 10 INJECTION, SOLUTION INTRAVENOUS at 10:30

## 2023-02-15 RX ADMIN — SUGAMMADEX 300 MG: 100 INJECTION, SOLUTION INTRAVENOUS at 11:13

## 2023-02-15 RX ADMIN — ESMOLOL HYDROCHLORIDE 20 MG: 10 INJECTION INTRAVENOUS at 10:25

## 2023-02-15 RX ADMIN — PROPOFOL 30 MG: 10 INJECTION, EMULSION INTRAVENOUS at 11:13

## 2023-02-15 RX ADMIN — ONDANSETRON HYDROCHLORIDE 4 MG: 2 INJECTION INTRAMUSCULAR; INTRAVENOUS at 11:05

## 2023-02-15 RX ADMIN — LIDOCAINE HYDROCHLORIDE 100 MG: 20 INJECTION, SOLUTION EPIDURAL; INFILTRATION; INTRACAUDAL; PERINEURAL at 10:04

## 2023-02-15 RX ADMIN — DEXAMETHASONE SODIUM PHOSPHATE 10 MG: 4 INJECTION, SOLUTION INTRAMUSCULAR; INTRAVENOUS at 10:10

## 2023-02-15 RX ADMIN — ROCURONIUM BROMIDE 45 MG: 10 INJECTION, SOLUTION INTRAVENOUS at 10:07

## 2023-02-15 RX ADMIN — PROPOFOL 200 MG: 10 INJECTION, EMULSION INTRAVENOUS at 10:04

## 2023-02-15 RX ADMIN — Medication 30 MG: at 10:14

## 2023-02-15 ASSESSMENT — PAIN SCALES - GENERAL
PAINLEVEL_OUTOF10: 0

## 2023-02-15 ASSESSMENT — PAIN - FUNCTIONAL ASSESSMENT: PAIN_FUNCTIONAL_ASSESSMENT: NONE - DENIES PAIN

## 2023-02-15 NOTE — PERIOP NOTE
TRANSFER - IN REPORT:    Verbal report received from Danis Larkin, [facility] on Lilliana Aviles  being received from PACU for routine progression of patient care      Report consisted of patient's Situation, Background, Assessment and   Recommendations(SBAR). Information from the following report(s) Surgery Report was reviewed with the receiving nurse. Opportunity for questions and clarification was provided. Assessment completed upon patient's arrival to unit and care assumed.

## 2023-02-15 NOTE — INTERVAL H&P NOTE
Update History & Physical    The patient's History and Physical of February 13, 2023 was reviewed with the patient and I examined the patient. There was no change. The surgical site was confirmed by the patient and me. Plan: The risks, benefits, expected outcome, and alternative to the recommended procedure have been discussed with the patient. Patient understands and wants to proceed with the procedure.      Electronically signed by Blanche Osman MD on 2/15/2023 at 9:15 AM

## 2023-02-15 NOTE — ANESTHESIA PRE PROCEDURE
Department of Anesthesiology  Preprocedure Note       Name:  Rose Blackwell   Age:  65 y.o.  :  1965                                          MRN:  [id number]         Date:  2/15/2023      Surgeon: Drew Pritchett):  Eri Viera MD    Procedure: Procedure(s):  ANTERIOR CERVICAL DECOMPRESSION FUSION CERVICAL SEVEN/THORACIC ONE W/C-ARM    Medications prior to admission:   Prior to Admission medications    Medication Sig Start Date End Date Taking? Authorizing Provider   atorvastatin (LIPITOR) 40 MG tablet Take 40 mg by mouth daily    Historical Provider, MD   citalopram (CELEXA) 10 MG tablet Take by mouth daily    Historical Provider, MD   empagliflozin (JARDIANCE) 25 MG tablet Take 25 mg by mouth daily 22   Historical Provider, MD   gabapentin (NEURONTIN) 300 MG capsule 3 times daily.  23   Historical Provider, MD   lisinopril (PRINIVIL;ZESTRIL) 10 MG tablet 20 mg daily    Historical Provider, MD   metFORMIN (GLUCOPHAGE) 1000 MG tablet Take 1,000 mg by mouth 2 times daily (with meals)    Historical Provider, MD   Tirzepatide Remus Clack) 2.5 MG/0.5ML SOPN SC injection every 7 days Every 22   Historical Provider, MD   diclofenac (VOLTAREN) 75 MG EC tablet Take by mouth daily    Historical Provider, MD   aspirin 81 MG EC tablet Take 81 mg by mouth daily    Historical Provider, MD   pioglitazone (ACTOS) 30 MG tablet Take 30 mg by mouth daily    Historical Provider, MD   Insulin Glargine (TOUJEO SOLOSTAR SC) Inject 20 mg into the skin daily    Historical Provider, MD   Potassium 99 MG TABS Take by mouth daily    Historical Provider, MD   vitamin B-12 (CYANOCOBALAMIN) 1000 MCG tablet Take 1,000 mcg by mouth daily    Historical Provider, MD   vitamin D 25 MCG (1000 UT) CAPS Take by mouth daily    Historical Provider, MD   Multiple Vitamin (MULTI-VITAMIN DAILY PO) Take by mouth daily    Historical Provider, MD   magnesium gluconate (MAGONATE) 500 MG tablet Take 500 mg by mouth daily Historical Provider, MD   GLUCOSAMINE-CHONDROITIN DS PO Take by mouth daily    Historical Provider, MD       Current medications:    Current Facility-Administered Medications   Medication Dose Route Frequency Provider Last Rate Last Admin    ceFAZolin (ANCEF) 2000 mg in sterile water 20 mL IV syringe  2,000 mg IntraVENous On Call to [address] MD        lactated ringers IV soln infusion   IntraVENous Continuous Mohit Ochoa  mL/hr at 02/15/23 0841 NoRateChange at 02/15/23 0841    [START ON 2023] acetaminophen (TYLENOL) tablet 1,000 mg  1,000 mg Oral Once Osbaldo Osullivan MD           Allergies:  No Known Allergies    Problem List:    Patient Active Problem List   Diagnosis Code    Severe obesity ([facility] [address] E66.01       Past Medical History:        Diagnosis Date    Anxiety     Arthritis     Cancer ([facility] [address]     skin    Diabetes mellitus ([facility] [address]     Hypercholesteremia     Hypertension     JOHN (obstructive sleep apnea)     does not use c pap any longer    Wears glasses        Past Surgical History:        Procedure Laterality Date    COLONOSCOPY         Social History:    Social History     Tobacco Use    Smoking status: Former     Types: Cigarettes     Quit date:      Years since quittin.    Smokeless tobacco: Never   Substance Use Topics    Alcohol use:  Yes     Alcohol/week: 5.0 standard drinks     Types: 5 Shots of liquor per week                                Counseling given: Not Answered      Vital Signs (Current):   Vitals:    02/15/23 0727   BP: (!) 142/77   Pulse: 75   Resp: 16   Temp: 98.3 °F (36.8 °C)   TempSrc: Temporal   SpO2: 96%   Weight: 243 lb 11.2 oz (110.5 kg)   Height: 5' 7\" (1.702 m)                                              BP Readings from Last 3 Encounters:   02/15/23 (!) 142/77   23 108/73       NPO Status: Time of last liquid consumption:                         Time of last solid consumption:                         Date of last liquid consumption: 02/14/23                        Date of last solid food consumption: 02/14/23    BMI:   Wt Readings from Last 3 Encounters:   02/15/23 243 lb 11.2 oz (110.5 kg)   02/10/23 240 lb (108.9 kg)   01/25/23 252 lb (114.3 kg)     Body mass index is 38.17 kg/m². CBC:   Lab Results   Component Value Date/Time    WBC 7.6 02/03/2023 01:54 PM    RBC 5.47 02/03/2023 01:54 PM    HGB 16.0 02/03/2023 01:54 PM    HCT 49.0 02/03/2023 01:54 PM    MCV 90 02/03/2023 01:54 PM    RDW 13.1 02/03/2023 01:54 PM     02/03/2023 01:54 PM       CMP:   Lab Results   Component Value Date/Time     02/03/2023 01:54 PM    K 4.7 02/03/2023 01:54 PM     02/03/2023 01:54 PM    CO2 26 02/03/2023 01:54 PM    BUN 24 02/03/2023 01:54 PM    CREATININE 1.1 02/03/2023 01:54 PM    AGRATIO 2.0 02/03/2023 01:54 PM    GLUCOSE 103 02/03/2023 01:54 PM    PROT 7.2 02/03/2023 01:54 PM    CALCIUM 10.1 02/03/2023 01:54 PM    BILITOT 0.8 02/03/2023 01:54 PM    ALKPHOS 85 02/03/2023 01:54 PM    AST 56 02/03/2023 01:54 PM     02/03/2023 01:54 PM       POC Tests:   Recent Labs     02/15/23  0810   POCGLU 139*       Coags: No results found for: PROTIME, INR, APTT    HCG (If Applicable): No results found for: PREGTESTUR, PREGSERUM, HCG, HCGQUANT     ABGs: No results found for: PHART, PO2ART, PBH4CLN, SXT3WWM, BEART, K7EZPAKN     Type & Screen (If Applicable):  No results found for: LABABO, LABRH    Drug/Infectious Status (If Applicable):  No results found for: HIV, HEPCAB    COVID-19 Screening (If Applicable): No results found for: COVID19        Anesthesia Evaluation  Patient summary reviewed and Nursing notes reviewed  Airway: Mallampati: III          Dental: normal exam         Pulmonary:   (+) sleep apnea:                             Cardiovascular:  Exercise tolerance: good (>4 METS),   (+) hypertension:,                   Neuro/Psych:               GI/Hepatic/Renal:            ROS comment: Obesity.    Endo/Other:    (+) DiabetesType II DM, , .                 Abdominal:             Vascular: Other Findings:           Anesthesia Plan      general     ASA 3       Induction: intravenous. Anesthetic plan and risks discussed with patient.                         Rubina Diaz MD   2/15/2023

## 2023-02-15 NOTE — BRIEF OP NOTE
Brief Postoperative Note      Patient: Sloane Mcdonald  YOB: 1965  MRN: [id number]    Date of Procedure: 2/15/2023    Pre-Op Diagnosis: CERVICAL STENOSIS    Post-Op Diagnosis: Same       Procedure(s):  ANTERIOR CERVICAL DECOMPRESSION FUSION CERVICAL SEVEN/THORACIC ONE W/C-ARM    Surgeon(s):  Angella Cheatham MD    Assistant:  Surgical Assistant: Angelia Vásquez    Anesthesia: General    Estimated Blood Loss (mL): Minimal    Complications: None    Specimens:   * No specimens in log *    Implants:  Implant Name Type Inv.  Item Serial No.  Lot No. LRB No. Used Action   [id number] - [id number]   [id number] [employer]  N/A 1 Implanted   PLATE ANT CERV CONSTRND 1 LVL 18MM OZARK VIEW - S000  PLATE ANT CERV CONSTRND 1 LVL 18MM OZARK VIEW 000 [employer]  N/A 1 Implanted   SCREW SPNL L14MM DIA4MM SELF STARTING EKATERINA ANT CERV TI OZARK - S000  SCREW SPNL L14MM DIA4MM SELF STARTING EKATERINA ANT CERV TI OZARK 000 ARACELY SPINE HOWM-WD  N/A 4 Implanted         Drains: * No LDAs found *    Findings: stenosis, difficult access    Electronically signed by Madi Fritz MD on 2/15/2023 at 11:02 AM

## 2023-02-15 NOTE — ANESTHESIA POSTPROCEDURE EVALUATION
Post-Anesthesia Evaluation and Assessment    Cardiovascular Function/Vital Signs  Visit Vitals  BP (!) 144/83   Pulse 89   Temp 97.9 °F (36.6 °C) (Temporal)   Resp 11   Ht 5' 7\" (1.702 m)   Wt 243 lb 11.2 oz (110.5 kg)   SpO2 97%   BMI 38.17 kg/m²       Patient is status post Procedure(s):  ANTERIOR CERVICAL DECOMPRESSION FUSION CERVICAL SEVEN/THORACIC ONE W/C-ARM. Nausea/Vomiting: Controlled. Postoperative hydration reviewed and adequate. Pain:      Managed. Neurological Status: At baseline. Mental Status and Level of Consciousness: Arousable. Pulmonary Status:       Adequate oxygenation and airway patent. Complications related to anesthesia: None    Post-anesthesia assessment completed. No concerns. Patient has met all discharge requirements.     Signed By: Valente Duane, MD    February 15, 2023

## 2023-02-15 NOTE — ADDENDUM NOTE
Addendum  created 02/15/23 1253 by IGOR Haywood - CRNA    Flowsheet accepted, Intraprocedure Flowsheets edited

## 2023-02-15 NOTE — PERIOP NOTE
TRANSFER - IN REPORT:    Verbal report received from OR RN on Ahn Shock  being received from OR for routine progression of patient care      Report consisted of patient's Situation, Background, Assessment and   Recommendations(SBAR). Information from the following report(s) Adult Overview, Surgery Report, Intake/Output, and MAR was reviewed with the receiving nurse. Opportunity for questions and clarification was provided. Assessment completed upon patient's arrival to unit and care assumed.

## 2023-02-15 NOTE — DISCHARGE INSTRUCTIONS
Dr. Zamora Ohm Instructions: Cervical Surgery    DO NOT take any NSAIDS if you had Fusion Surgery. ACTIVITIES:  Change positions every hour while you are awake. Walking is the best way to rebuild strength. Wear your soft collar just for comfort if it is provided to you. You may remove if desired. Sleep with your head elevated for 2-3 nights after surgery to prevent swelling. Avoid strenuous activity, such as yard work, vacuuming, or lifting anything heavier than a gallon of milk. Avoid strenuous activities, such as vacuuming, and do not lift anything heavier than 1 gallon of milk (or about 5-8 pounds). Walk at a pace that avoids fatigue or severe pain. Do not try to walk several blocks the first day! Follow-up with Dr. Warren Lezama will be 7-10 days from surgery. BATHING and INCISION CARE:  The incision may be tender or feel numb: this is normal.   Keep the incision clean and dry. You may shower 3 days after surgery. Cover the dressing with saran wrap before getting in the shower. The incision is closed with sutures under the skin and glue on top. Do not apply any lotions, ointments or oils on the incision. Do not remove the dressing. Your dressing will be changed at your first post op appointment. If it comes loose or is damaged, dirty or wet before this appointment, call your home health nurse (if you are being seen by a nurse at home) or the office to have the dressing changed. If you notice any excessive swelling, redness, or persistent drainage around the incision, notify the office immediately. CONSTIPATION:  Take a stool softener twice a day while you are taking a narcotic. If you have not had a bowel movement within 3 days of surgery, you will need to use a laxative or suppository that can be obtained over the counter at your local pharmacy     ICE  Use ice on your neck and shoulders to decrease pain and swelling. Do NOT use heat.     MEDICATIONS:  If you had fusion surgery DO NOT TAKE non-steroidal anti-inflammatory (NSAID) medications, such as Motrin, Aleve, Advil Naprosyn, Ibuprofen or aspirin. Take Tylenol/Acetaminophen every 4-6 hours for pain. Do not take more than 3000 mg each day. (Do not take Tylenol/Acetaminophen if you have liver problems). Take your prescribed narcotic pain medication as needed for pain that is not tolerable. Eat food before you take any pain medication to avoid nausea. If you need a medication refill, please call the office during working hours at least 2 days before your prescription runs out. Do not wait until your bottle is empty to call for a refill. EATING & NUTRITION:  You can eat anything you can chew and make soft. Take small bites and avoid tough foods like meat and bread. Painful swallowing is normal after surgery and may be experienced up to 2 weeks post-operatively. You may obtain over the counter Chloraseptic spray. The more you swallow the better your throat will feel. If at any point you can no longer swallow your own saliva, call Dr. sOorio Sutton office right away. Eat a healthy balanced diet to help your wound to heal. Protein supplements should be considered if you are eating less than 50% of your meal.   Drink plenty of water to stay hydrated. DRIVING & RETURN TO WORK:   You will be told at your follow up appointment if it is safe for you to drive or return to work and will be provided with a [id number]-note if needed (please ask). NEVER drive while taking narcotic medication. WHEN TO CALL THE OFFICE:  If you have severe pain unrelieved by the medications, new numbness or tingling in your legs;        If you have a fever of 101.0°F or greater   If you notice increased swelling, redness, or increased drainage from the incision    If you are not able to urinate  If you are not able to control your bowels   If you are unable to swallow your own saliva      [address], [address] number is [phone]. They are open from 8:00am to 5:00pm Mon - Fri. After 5:00pm, or on weekends/holidays, please call the answering service at [phone] for a call back. DISCHARGE SUMMARY from Nurse    PATIENT INSTRUCTIONS:    After general anesthesia or intravenous sedation, for 24 hours or while taking prescription Narcotics:  Limit your activities  Do not drive and operate hazardous machinery  Do not make important personal or business decisions  Do  not drink alcoholic beverages  If you have not urinated within 8 hours after discharge, please contact your surgeon on call. Report the following to your surgeon:  Excessive pain, swelling, redness or odor of or around the surgical area  Temperature over 100.5  Nausea and vomiting lasting longer than 4 hours or if unable to take medications  Any signs of decreased circulation or nerve impairment to extremity: change in color, persistent  numbness, tingling, coldness or increase pain  Any questions    What to do at Home:  Recommended activity: activity as tolerated and no driving for today, no driving until cleared by Dr Kayleigh Childs    *  Please give a list of your current medications to your Primary Care Provider. *  Please update this list whenever your medications are discontinued, doses are      changed, or new medications (including over-the-counter products) are added. *  Please carry medication information at all times in case of emergency situations. These are general instructions for a healthy lifestyle:    No smoking/ No tobacco products/ Avoid exposure to second hand smoke  Surgeon General's Warning:  Quitting smoking now greatly reduces serious risk to your health.     Obesity, smoking, and sedentary lifestyle greatly increases your risk for illness    A healthy diet, regular physical exercise & weight monitoring are important for maintaining a healthy lifestyle    You may be retaining fluid if you have a history of heart failure or if you experience any of the following symptoms:  Weight gain of 3 pounds or more overnight or 5 pounds in a week, increased swelling in our hands or feet or shortness of breath while lying flat in bed. Please call your doctor as soon as you notice any of these symptoms; do not wait until your next office visit. The discharge information has been reviewed with the caregiver. The patient and caregiver verbalized understanding. Discharge medications reviewed with the patient and caregiver and appropriate educational materials and side effects teaching were provided.   ___________________________________________________________________________________________________________________________________

## 2023-02-15 NOTE — PERIOP NOTE
Reviewed discharge plan of care with lashonda and his son. Written instructions provided as well.  They verbalized understanding

## 2023-02-15 NOTE — PERIOP NOTE
TRANSFER - OUT REPORT:    Verbal report given to Rachel Reese RN on [place] [facility]  being transferred to [place] [id number] for routine progression of patient care       Report consisted of patient's Situation, Background, Assessment and   Recommendations(SBAR). Information from the following report(s) Adult Overview, Surgery Report, Intake/Output, and MAR was reviewed with the receiving nurse. [place] Assessment: No data recorded  Lines:   Peripheral IV 02/15/23 Right Forearm (Active)   Site Assessment Clean, dry & intact 02/15/23 1201   Line Status Infusing 02/15/23 1201   Phlebitis Assessment No symptoms 02/15/23 1201   Infiltration Assessment 0 02/15/23 1201   Dressing Status Clean, dry & intact 02/15/23 1201        Opportunity for questions and clarification was provided.       Patient transported with:  [employer]

## 2023-02-16 NOTE — OP NOTE
[address] [place] [address]   OPERATIVE REPORT    Name:  Brianda Zendejas  MR#:   [id number]  :  1965  ACCOUNT #:  [de-identified]  DATE OF SERVICE:  2023    PREOPERATIVE DIAGNOSIS:  Cervical spinal stenosis, C7-T1. POSTOPERATIVE DIAGNOSIS:  Cervical spinal stenosis, C7-T1. PROCEDURES PERFORMED:  Anterior cervical decompression and fusion C7-T1, structural allograft x1, anterior cervical plate fixation J9-O7 with Drummond anterior cervical plate and screws. SURGEON:  Luis Walters MD    ASSISTANT:  Kareem boone    ANESTHESIA:  General endotracheal.    COMPLICATIONS:  None. SPECIMENS REMOVED:  None. IMPLANTS:  Drummond plate and screws. ESTIMATED BLOOD LOSS:  Minimal.    FINDINGS:  Access was difficult due to the patient's morbid obesity and habitus. Right-sided foraminal stenosis was noted. Decompression and uncinate resection were accomplished. PROCEDURE:  Following induction of endotracheal anesthesia, the patient was placed with the head in neutral position on a cervical head ulloa. The patient was prepped, draped in usual fashion. Right-sided approach was utilized about a fingerbreath above his clavicle. Sternocleidomastoid and great vessels were mobilized laterally, esophagus and trachea mobilized medially with blunt finger dissection. The prevertebral fascia was entered and carefully the cervical spine delineated. I could visualize radiographically the C5-6, which I marked, and then went two levels below and could place a Comstock pin at C7 and T1. Cloward self-retaining retractor blade was placed under the cover of longus colli musculature bilaterally. C-arm image re-confirmed our level. Annulotomy was done, radical diskectomy done, thinning the posterior marginal osteophytes with a high-speed bur and resected with a 4.0 Darby curette and 1-mm Kerrison.   Thorough decompression of the spine done especially on the patient's symptomatic right hand side with attempt to go as far lateral as I could with resection of the uncinate. The wound was copiously irrigated. No apparent bleeding. A #8 structural allograft tamped firmly into place with excellent bony apposition. Anterior cervical locking plate was utilized, Buckingham type, two screws in C7, two in T1, and locking device was engaged. The wound was irrigated. It was dried. There was no evidence of any bleeding. Vancomycin powder instilled and it was dry and white. The neck was closed in layers, and the skin closed with a subcuticular suture and Dermabond. A sterile occlusive dressing placed upon the wound. All counts were correct.       MD BRE [id number]  D:  02/15/2023 11:18  T:  02/16/2023 3:07  JOB #:  [id number]

## (undated) DEVICE — BLADE ES ELASTOMERIC COAT INSUL DURABLE BEND UPTO 90DEG

## (undated) DEVICE — SUTURE MCRYL SZ 3-0 L27IN ABSRB UD L26MM SH 1/2 CIR Y416H

## (undated) DEVICE — SYRINGE MARK SCALE W/ LL TIP 3ML 200 S/C

## (undated) DEVICE — MARKER,SKIN,WI/RULER AND LABELS: Brand: MEDLINE

## (undated) DEVICE — 1010 S-DRAPE TOWEL DRAPE 10/BX: Brand: STERI-DRAPE™

## (undated) DEVICE — DRAIN SURG W7MMXL20CM SIL FULL PERF HUBLESS FLAT RADPQ STRP

## (undated) DEVICE — SUTURE VCRL + SZ 2-0 L27IN ABSRB UD CT-2 L26MM 1/2 CIR TAPR VCP269H

## (undated) DEVICE — WATERPROOF, BACTERIA PROOF DRESSING WITH ABSORBENT SEE THROUGH PAD: Brand: OPSITE POST-OP VISIBLE 15X10CM CTN 20

## (undated) DEVICE — APPLICATOR MEDICATED 3 CC SOLUTION HI LT ORNG CHLORAPREP

## (undated) DEVICE — ROUND DISSECTORS: Brand: DEROYAL

## (undated) DEVICE — SYRINGE MED 3ML CLR PLAS STD N CTRL LUERLOCK TIP DISP

## (undated) DEVICE — ANTERIOR CERVICAL POSITIONING SYSTEM SINGLE USE KIT BOX OF 5: Brand: BONEFOAM

## (undated) DEVICE — PACK PROCEDURE SURG ANTR CERV DISCECTOMY

## (undated) DEVICE — INTENDED FOR TISSUE SEPARATION, AND OTHER PROCEDURES THAT REQUIRE A SHARP SURGICAL BLADE TO PUNCTURE OR CUT.: Brand: BARD-PARKER SAFETY BLADES SIZE 10, STERILE

## (undated) DEVICE — ELECTRODE PT RET AD L9FT HI MOIST COND ADH HYDRGEL CORDED

## (undated) DEVICE — 3.0MM PRECISION NEURO (MATCH HEAD)

## (undated) DEVICE — SYSTEM SKIN CLSR 22CM DERMBND PRINEO

## (undated) DEVICE — GLOVE ORANGE PI 8   MSG9080

## (undated) DEVICE — GARMENT,MEDLINE,DVT,INT,CALF,MED, GEN2: Brand: MEDLINE

## (undated) DEVICE — BLADE ES L6IN ELASTOMERIC COAT EXT DURABLE BEND UPTO 90DEG

## (undated) DEVICE — SURGIFOAM SPNG SZ 100

## (undated) DEVICE — RESERVOIR,SUCTION,100CC,SILICONE: Brand: MEDLINE

## (undated) DEVICE — 3M™ TEGADERM™ TRANSPARENT FILM DRESSING FRAME STYLE, 1626W, 4 IN X 4-3/4 IN (10 CM X 12 CM), 50/CT 4CT/CASE: Brand: 3M™ TEGADERM™

## (undated) DEVICE — SOL IRRIGATION INJ NACL 0.9% 500ML BTL

## (undated) DEVICE — 3M™ BAIR PAWS FLEX™ WARMING GOWN, SMALL, 20 PER CASE 81103: Brand: BAIR PAWS™